# Patient Record
Sex: FEMALE | Race: WHITE | NOT HISPANIC OR LATINO | Employment: FULL TIME | URBAN - METROPOLITAN AREA
[De-identification: names, ages, dates, MRNs, and addresses within clinical notes are randomized per-mention and may not be internally consistent; named-entity substitution may affect disease eponyms.]

---

## 2017-04-28 ENCOUNTER — OFFICE VISIT - HEALTHEAST (OUTPATIENT)
Dept: FAMILY MEDICINE | Facility: CLINIC | Age: 32
End: 2017-04-28

## 2017-04-28 DIAGNOSIS — F41.1 ANXIETY STATE: ICD-10-CM

## 2017-04-28 DIAGNOSIS — Z00.00 HEALTHCARE MAINTENANCE: ICD-10-CM

## 2017-04-28 DIAGNOSIS — Z30.9 ENCOUNTER FOR CONTRACEPTIVE MANAGEMENT, UNSPECIFIED CONTRACEPTIVE ENCOUNTER TYPE: ICD-10-CM

## 2017-04-28 DIAGNOSIS — N76.0 ACUTE VAGINITIS: ICD-10-CM

## 2017-04-28 LAB
CHOLEST SERPL-MCNC: 200 MG/DL
FASTING STATUS PATIENT QL REPORTED: YES
HDLC SERPL-MCNC: 43 MG/DL
LDLC SERPL CALC-MCNC: 139 MG/DL
TRIGL SERPL-MCNC: 91 MG/DL

## 2017-04-28 ASSESSMENT — MIFFLIN-ST. JEOR: SCORE: 1535.11

## 2017-05-04 LAB
BKR LAB AP ABNORMAL BLEEDING: NO
BKR LAB AP BIRTH CONTROL/HORMONES: NORMAL
BKR LAB AP CERVICAL APPEARANCE: NORMAL
BKR LAB AP GYN ADEQUACY: NORMAL
BKR LAB AP GYN INTERPRETATION: NORMAL
BKR LAB AP GYN OTHER FINDINGS: NORMAL
BKR LAB AP HPV REFLEX: NORMAL
BKR LAB AP LMP: NORMAL
BKR LAB AP PATIENT STATUS: NORMAL
BKR LAB AP PREVIOUS ABNORMAL: NORMAL
BKR LAB AP PREVIOUS NORMAL: 2014
HIGH RISK?: NO
HPV INTERPRETATION - HISTORICAL: NORMAL
HPV INTERPRETER - HISTORICAL: NORMAL
PATH REPORT.COMMENTS IMP SPEC: NORMAL
RESULT FLAG (HE HISTORICAL CONVERSION): NORMAL

## 2017-09-14 ENCOUNTER — OFFICE VISIT - HEALTHEAST (OUTPATIENT)
Dept: FAMILY MEDICINE | Facility: CLINIC | Age: 32
End: 2017-09-14

## 2017-09-14 ENCOUNTER — HOSPITAL ENCOUNTER (OUTPATIENT)
Dept: ULTRASOUND IMAGING | Facility: HOSPITAL | Age: 32
Discharge: HOME | End: 2017-09-14
Attending: FAMILY MEDICINE

## 2017-09-14 DIAGNOSIS — R11.10 VOMITING: ICD-10-CM

## 2017-09-14 DIAGNOSIS — R50.9 FEVER: ICD-10-CM

## 2017-09-14 DIAGNOSIS — R11.0 NAUSEA: ICD-10-CM

## 2017-09-14 DIAGNOSIS — Z32.01 POSITIVE URINE PREGNANCY TEST: ICD-10-CM

## 2017-09-14 ASSESSMENT — MIFFLIN-ST. JEOR: SCORE: 1552.4

## 2017-09-15 ENCOUNTER — COMMUNICATION - HEALTHEAST (OUTPATIENT)
Dept: FAMILY MEDICINE | Facility: CLINIC | Age: 32
End: 2017-09-15

## 2017-10-13 ENCOUNTER — PRENATAL OFFICE VISIT - HEALTHEAST (OUTPATIENT)
Dept: FAMILY MEDICINE | Facility: CLINIC | Age: 32
End: 2017-10-13

## 2017-10-13 DIAGNOSIS — Z3A.11 11 WEEKS GESTATION OF PREGNANCY: ICD-10-CM

## 2017-10-13 LAB — HIV 1+2 AB+HIV1 P24 AG SERPL QL IA: NEGATIVE

## 2017-10-13 ASSESSMENT — MIFFLIN-ST. JEOR: SCORE: 1530.57

## 2017-10-14 LAB — HBV SURFACE AG SERPL QL IA: NEGATIVE

## 2017-10-16 LAB — SYPHILIS RPR SCREEN - HISTORICAL: NORMAL

## 2017-10-19 ENCOUNTER — COMMUNICATION - HEALTHEAST (OUTPATIENT)
Dept: FAMILY MEDICINE | Facility: CLINIC | Age: 32
End: 2017-10-19

## 2017-10-19 DIAGNOSIS — Z3A.11 11 WEEKS GESTATION OF PREGNANCY: ICD-10-CM

## 2017-10-24 ENCOUNTER — RECORDS - HEALTHEAST (OUTPATIENT)
Dept: ADMINISTRATIVE | Facility: OTHER | Age: 32
End: 2017-10-24

## 2017-10-26 ENCOUNTER — COMMUNICATION - HEALTHEAST (OUTPATIENT)
Dept: FAMILY MEDICINE | Facility: CLINIC | Age: 32
End: 2017-10-26

## 2017-11-13 ENCOUNTER — PRENATAL OFFICE VISIT - HEALTHEAST (OUTPATIENT)
Dept: FAMILY MEDICINE | Facility: CLINIC | Age: 32
End: 2017-11-13

## 2017-11-13 DIAGNOSIS — Z3A.15 15 WEEKS GESTATION OF PREGNANCY: ICD-10-CM

## 2017-12-18 ENCOUNTER — HOSPITAL ENCOUNTER (OUTPATIENT)
Dept: ULTRASOUND IMAGING | Facility: HOSPITAL | Age: 32
Discharge: HOME | End: 2017-12-18
Attending: FAMILY MEDICINE

## 2017-12-18 DIAGNOSIS — Z3A.15 15 WEEKS GESTATION OF PREGNANCY: ICD-10-CM

## 2017-12-20 ENCOUNTER — PRENATAL OFFICE VISIT - HEALTHEAST (OUTPATIENT)
Dept: FAMILY MEDICINE | Facility: CLINIC | Age: 32
End: 2017-12-20

## 2017-12-20 DIAGNOSIS — Z3A.20 20 WEEKS GESTATION OF PREGNANCY: ICD-10-CM

## 2018-01-17 ENCOUNTER — COMMUNICATION - HEALTHEAST (OUTPATIENT)
Dept: FAMILY MEDICINE | Facility: CLINIC | Age: 33
End: 2018-01-17

## 2018-01-17 ENCOUNTER — PRENATAL OFFICE VISIT - HEALTHEAST (OUTPATIENT)
Dept: FAMILY MEDICINE | Facility: CLINIC | Age: 33
End: 2018-01-17

## 2018-01-17 DIAGNOSIS — Z3A.24 24 WEEKS GESTATION OF PREGNANCY: ICD-10-CM

## 2018-01-17 DIAGNOSIS — F41.1 ANXIETY STATE: ICD-10-CM

## 2018-01-18 ENCOUNTER — COMMUNICATION - HEALTHEAST (OUTPATIENT)
Dept: FAMILY MEDICINE | Facility: CLINIC | Age: 33
End: 2018-01-18

## 2018-01-18 LAB
CLUE CELLS: ABNORMAL
TRICHOMONAS, WET PREP: ABNORMAL
YEAST, WET PREP: ABNORMAL

## 2018-01-24 ENCOUNTER — OFFICE VISIT - HEALTHEAST (OUTPATIENT)
Dept: FAMILY MEDICINE | Facility: CLINIC | Age: 33
End: 2018-01-24

## 2018-01-24 DIAGNOSIS — N76.0 VAGINITIS: ICD-10-CM

## 2018-01-24 DIAGNOSIS — Z3A.25 25 WEEKS GESTATION OF PREGNANCY: ICD-10-CM

## 2018-01-24 LAB
CLUE CELLS: NORMAL
FASTING STATUS PATIENT QL REPORTED: YES
GLUCOSE 1H P 50 G GLC PO SERPL-MCNC: 153 MG/DL (ref 70–139)
HGB BLD-MCNC: 10.6 G/DL (ref 12–16)
TRICHOMONAS, WET PREP: NORMAL
YEAST, WET PREP: NORMAL

## 2018-01-24 ASSESSMENT — MIFFLIN-ST. JEOR: SCORE: 1571.11

## 2018-01-25 ENCOUNTER — COMMUNICATION - HEALTHEAST (OUTPATIENT)
Dept: FAMILY MEDICINE | Facility: CLINIC | Age: 33
End: 2018-01-25

## 2018-01-25 DIAGNOSIS — Z3A.26 26 WEEKS GESTATION OF PREGNANCY: ICD-10-CM

## 2018-01-30 ENCOUNTER — AMBULATORY - HEALTHEAST (OUTPATIENT)
Dept: LAB | Facility: CLINIC | Age: 33
End: 2018-01-30

## 2018-01-30 DIAGNOSIS — Z3A.26 26 WEEKS GESTATION OF PREGNANCY: ICD-10-CM

## 2018-01-30 LAB
FASTING STATUS PATIENT QL REPORTED: NORMAL
GLUCOSE 1H P 100 G GLC PO SERPL-MCNC: 150 MG/DL
GLUCOSE 2H P 100 G GLC PO SERPL-MCNC: 103 MG/DL
GLUCOSE 3H P 100 G GLC PO SERPL-MCNC: 103 MG/DL
GLUCOSE P FAST SERPL-MCNC: 86 MG/DL

## 2018-02-26 ENCOUNTER — PRENATAL OFFICE VISIT - HEALTHEAST (OUTPATIENT)
Dept: FAMILY MEDICINE | Facility: CLINIC | Age: 33
End: 2018-02-26

## 2018-02-26 DIAGNOSIS — Z3A.30 30 WEEKS GESTATION OF PREGNANCY: ICD-10-CM

## 2018-02-26 LAB — HGB BLD-MCNC: 11 G/DL (ref 12–16)

## 2018-03-04 ENCOUNTER — COMMUNICATION - HEALTHEAST (OUTPATIENT)
Dept: FAMILY MEDICINE | Facility: CLINIC | Age: 33
End: 2018-03-04

## 2018-03-15 ENCOUNTER — COMMUNICATION - HEALTHEAST (OUTPATIENT)
Dept: FAMILY MEDICINE | Facility: CLINIC | Age: 33
End: 2018-03-15

## 2018-03-26 ENCOUNTER — PRENATAL OFFICE VISIT - HEALTHEAST (OUTPATIENT)
Dept: FAMILY MEDICINE | Facility: CLINIC | Age: 33
End: 2018-03-26

## 2018-03-26 DIAGNOSIS — Z3A.34 34 WEEKS GESTATION OF PREGNANCY: ICD-10-CM

## 2018-04-09 ENCOUNTER — PRENATAL OFFICE VISIT - HEALTHEAST (OUTPATIENT)
Dept: FAMILY MEDICINE | Facility: CLINIC | Age: 33
End: 2018-04-09

## 2018-04-09 DIAGNOSIS — Z3A.36 36 WEEKS GESTATION OF PREGNANCY: ICD-10-CM

## 2018-04-10 ENCOUNTER — ANESTHESIA - HEALTHEAST (OUTPATIENT)
Dept: OBGYN | Facility: HOSPITAL | Age: 33
End: 2018-04-10

## 2018-04-10 LAB
ALLERGIC TO PENICILLIN: NO
GP B STREP DNA SPEC QL NAA+PROBE: NEGATIVE

## 2018-04-11 ENCOUNTER — RECORDS - HEALTHEAST (OUTPATIENT)
Dept: ADMINISTRATIVE | Facility: OTHER | Age: 33
End: 2018-04-11

## 2018-04-16 ENCOUNTER — COMMUNICATION - HEALTHEAST (OUTPATIENT)
Dept: FAMILY MEDICINE | Facility: CLINIC | Age: 33
End: 2018-04-16

## 2018-04-22 ENCOUNTER — COMMUNICATION - HEALTHEAST (OUTPATIENT)
Dept: FAMILY MEDICINE | Facility: CLINIC | Age: 33
End: 2018-04-22

## 2018-04-22 DIAGNOSIS — F41.1 ANXIETY STATE: ICD-10-CM

## 2018-04-25 ENCOUNTER — COMMUNICATION - HEALTHEAST (OUTPATIENT)
Dept: FAMILY MEDICINE | Facility: CLINIC | Age: 33
End: 2018-04-25

## 2018-05-07 ENCOUNTER — COMMUNICATION - HEALTHEAST (OUTPATIENT)
Dept: TELEHEALTH | Facility: CLINIC | Age: 33
End: 2018-05-07

## 2018-05-07 ENCOUNTER — COMMUNICATION - HEALTHEAST (OUTPATIENT)
Dept: HEALTH INFORMATION MANAGEMENT | Facility: CLINIC | Age: 33
End: 2018-05-07

## 2018-05-25 ENCOUNTER — OFFICE VISIT - HEALTHEAST (OUTPATIENT)
Dept: FAMILY MEDICINE | Facility: CLINIC | Age: 33
End: 2018-05-25

## 2018-05-25 ASSESSMENT — MIFFLIN-ST. JEOR: SCORE: 1518.1

## 2018-06-26 ENCOUNTER — COMMUNICATION - HEALTHEAST (OUTPATIENT)
Dept: FAMILY MEDICINE | Facility: CLINIC | Age: 33
End: 2018-06-26

## 2018-08-06 ENCOUNTER — OFFICE VISIT - HEALTHEAST (OUTPATIENT)
Dept: FAMILY MEDICINE | Facility: CLINIC | Age: 33
End: 2018-08-06

## 2018-08-06 DIAGNOSIS — J02.9 SORE THROAT: ICD-10-CM

## 2018-08-06 LAB — DEPRECATED S PYO AG THROAT QL EIA: NORMAL

## 2018-08-06 ASSESSMENT — MIFFLIN-ST. JEOR: SCORE: 1499.67

## 2018-08-07 LAB — GROUP A STREP BY PCR: NORMAL

## 2018-10-12 ENCOUNTER — AMBULATORY - HEALTHEAST (OUTPATIENT)
Dept: NURSING | Facility: CLINIC | Age: 33
End: 2018-10-12

## 2018-11-23 ENCOUNTER — OFFICE VISIT - HEALTHEAST (OUTPATIENT)
Dept: FAMILY MEDICINE | Facility: CLINIC | Age: 33
End: 2018-11-23

## 2018-11-23 DIAGNOSIS — R10.84 ABDOMINAL PAIN, GENERALIZED: ICD-10-CM

## 2018-11-23 DIAGNOSIS — F41.1 ANXIETY STATE: ICD-10-CM

## 2018-11-23 LAB
ALBUMIN SERPL-MCNC: 3.9 G/DL (ref 3.5–5)
ALP SERPL-CCNC: 94 U/L (ref 45–120)
ALT SERPL W P-5'-P-CCNC: 10 U/L (ref 0–45)
ANION GAP SERPL CALCULATED.3IONS-SCNC: 9 MMOL/L (ref 5–18)
AST SERPL W P-5'-P-CCNC: 12 U/L (ref 0–40)
BILIRUB SERPL-MCNC: 0.4 MG/DL (ref 0–1)
BUN SERPL-MCNC: 8 MG/DL (ref 8–22)
CALCIUM SERPL-MCNC: 9.3 MG/DL (ref 8.5–10.5)
CHLORIDE BLD-SCNC: 105 MMOL/L (ref 98–107)
CO2 SERPL-SCNC: 24 MMOL/L (ref 22–31)
CREAT SERPL-MCNC: 0.62 MG/DL (ref 0.6–1.1)
ERYTHROCYTE [DISTWIDTH] IN BLOOD BY AUTOMATED COUNT: 11.7 % (ref 11–14.5)
GFR SERPL CREATININE-BSD FRML MDRD: >60 ML/MIN/1.73M2
GLUCOSE BLD-MCNC: 84 MG/DL (ref 70–125)
HCT VFR BLD AUTO: 38.6 % (ref 35–47)
HGB BLD-MCNC: 13 G/DL (ref 12–16)
MCH RBC QN AUTO: 28.4 PG (ref 27–34)
MCHC RBC AUTO-ENTMCNC: 33.7 G/DL (ref 32–36)
MCV RBC AUTO: 84 FL (ref 80–100)
PLATELET # BLD AUTO: 369 THOU/UL (ref 140–440)
PMV BLD AUTO: 7.2 FL (ref 7–10)
POTASSIUM BLD-SCNC: 3.8 MMOL/L (ref 3.5–5)
PROT SERPL-MCNC: 7.5 G/DL (ref 6–8)
RBC # BLD AUTO: 4.59 MILL/UL (ref 3.8–5.4)
SODIUM SERPL-SCNC: 138 MMOL/L (ref 136–145)
WBC: 7.8 THOU/UL (ref 4–11)

## 2018-11-23 ASSESSMENT — MIFFLIN-ST. JEOR: SCORE: 1516.68

## 2019-01-29 ENCOUNTER — COMMUNICATION - HEALTHEAST (OUTPATIENT)
Dept: FAMILY MEDICINE | Facility: CLINIC | Age: 34
End: 2019-01-29

## 2019-02-11 ENCOUNTER — COMMUNICATION - HEALTHEAST (OUTPATIENT)
Dept: FAMILY MEDICINE | Facility: CLINIC | Age: 34
End: 2019-02-11

## 2019-04-24 ENCOUNTER — COMMUNICATION - HEALTHEAST (OUTPATIENT)
Dept: FAMILY MEDICINE | Facility: CLINIC | Age: 34
End: 2019-04-24

## 2019-06-19 ENCOUNTER — COMMUNICATION - HEALTHEAST (OUTPATIENT)
Dept: FAMILY MEDICINE | Facility: CLINIC | Age: 34
End: 2019-06-19

## 2019-06-25 ENCOUNTER — OFFICE VISIT - HEALTHEAST (OUTPATIENT)
Dept: FAMILY MEDICINE | Facility: CLINIC | Age: 34
End: 2019-06-25

## 2019-06-25 DIAGNOSIS — Z32.00 POSSIBLE PREGNANCY: ICD-10-CM

## 2019-06-25 LAB — HCG UR QL: POSITIVE

## 2019-06-25 ASSESSMENT — MIFFLIN-ST. JEOR: SCORE: 1588.12

## 2019-07-12 ENCOUNTER — HOSPITAL ENCOUNTER (OUTPATIENT)
Dept: ULTRASOUND IMAGING | Facility: HOSPITAL | Age: 34
Discharge: HOME | End: 2019-07-12
Attending: FAMILY MEDICINE

## 2019-07-12 DIAGNOSIS — Z32.00 POSSIBLE PREGNANCY: ICD-10-CM

## 2019-07-23 ENCOUNTER — COMMUNICATION - HEALTHEAST (OUTPATIENT)
Dept: FAMILY MEDICINE | Facility: CLINIC | Age: 34
End: 2019-07-23

## 2019-07-26 ENCOUNTER — COMMUNICATION - HEALTHEAST (OUTPATIENT)
Dept: FAMILY MEDICINE | Facility: CLINIC | Age: 34
End: 2019-07-26

## 2019-08-11 ENCOUNTER — COMMUNICATION - HEALTHEAST (OUTPATIENT)
Dept: FAMILY MEDICINE | Facility: CLINIC | Age: 34
End: 2019-08-11

## 2019-08-12 ENCOUNTER — COMMUNICATION - HEALTHEAST (OUTPATIENT)
Dept: FAMILY MEDICINE | Facility: CLINIC | Age: 34
End: 2019-08-12

## 2019-08-12 ENCOUNTER — TRANSFERRED RECORDS (OUTPATIENT)
Dept: HEALTH INFORMATION MANAGEMENT | Facility: CLINIC | Age: 34
End: 2019-08-12

## 2019-08-12 ENCOUNTER — PRENATAL OFFICE VISIT - HEALTHEAST (OUTPATIENT)
Dept: FAMILY MEDICINE | Facility: CLINIC | Age: 34
End: 2019-08-12

## 2019-08-12 DIAGNOSIS — Z34.82 ENCOUNTER FOR SUPERVISION OF OTHER NORMAL PREGNANCY IN SECOND TRIMESTER: ICD-10-CM

## 2019-08-12 DIAGNOSIS — F41.1 ANXIETY STATE: ICD-10-CM

## 2019-08-12 DIAGNOSIS — Z00.00 ROUTINE GENERAL MEDICAL EXAMINATION AT A HEALTH CARE FACILITY: ICD-10-CM

## 2019-08-12 LAB
ALBUMIN UR-MCNC: NEGATIVE MG/DL
APPEARANCE UR: CLEAR
BASOPHILS # BLD AUTO: 0 THOU/UL (ref 0–0.2)
BASOPHILS NFR BLD AUTO: 0 % (ref 0–2)
BILIRUB UR QL STRIP: NEGATIVE
COLOR UR AUTO: YELLOW
EOSINOPHIL # BLD AUTO: 0.2 THOU/UL (ref 0–0.4)
EOSINOPHIL NFR BLD AUTO: 3 % (ref 0–6)
ERYTHROCYTE [DISTWIDTH] IN BLOOD BY AUTOMATED COUNT: 13.7 % (ref 11–14.5)
GLUCOSE UR STRIP-MCNC: NEGATIVE MG/DL
HCT VFR BLD AUTO: 35 % (ref 35–47)
HGB BLD-MCNC: 11.6 G/DL (ref 12–16)
HGB UR QL STRIP: NEGATIVE
HIV 1+2 AB+HIV1 P24 AG SERPL QL IA: NEGATIVE
HPV ABSTRACT: NORMAL
KETONES UR STRIP-MCNC: NEGATIVE MG/DL
LEUKOCYTE ESTERASE UR QL STRIP: ABNORMAL
LYMPHOCYTES # BLD AUTO: 3.1 THOU/UL (ref 0.8–4.4)
LYMPHOCYTES NFR BLD AUTO: 33 % (ref 20–40)
MCH RBC QN AUTO: 28 PG (ref 27–34)
MCHC RBC AUTO-ENTMCNC: 33.1 G/DL (ref 32–36)
MCV RBC AUTO: 85 FL (ref 80–100)
MONOCYTES # BLD AUTO: 0.4 THOU/UL (ref 0–0.9)
MONOCYTES NFR BLD AUTO: 5 % (ref 2–10)
NEUTROPHILS # BLD AUTO: 5.4 THOU/UL (ref 2–7.7)
NEUTROPHILS NFR BLD AUTO: 59 % (ref 50–70)
NITRATE UR QL: NEGATIVE
PAP-ABSTRACT: ABNORMAL
PH UR STRIP: 6.5 [PH] (ref 5–8)
PLATELET # BLD AUTO: 288 THOU/UL (ref 140–440)
PMV BLD AUTO: 10.3 FL (ref 8.5–12.5)
RBC # BLD AUTO: 4.14 MILL/UL (ref 3.8–5.4)
SP GR UR STRIP: 1.02 (ref 1–1.03)
UROBILINOGEN UR STRIP-ACNC: ABNORMAL
WBC: 9.2 THOU/UL (ref 4–11)

## 2019-08-12 ASSESSMENT — MIFFLIN-ST. JEOR: SCORE: 1581.32

## 2019-08-13 LAB
ABO/RH(D): NORMAL
ABORH REPEAT: NORMAL
ANTIBODY SCREEN: NEGATIVE
BACTERIA SPEC CULT: NO GROWTH
HBV SURFACE AG SERPL QL IA: NEGATIVE
HPV SOURCE: NORMAL
HUMAN PAPILLOMA VIRUS 16 DNA: NEGATIVE
HUMAN PAPILLOMA VIRUS 18 DNA: NEGATIVE
HUMAN PAPILLOMA VIRUS FINAL DIAGNOSIS: NORMAL
HUMAN PAPILLOMA VIRUS OTHER HR: NEGATIVE
RUBV IGG SERPL QL IA: POSITIVE
SPECIMEN DESCRIPTION: NORMAL
T PALLIDUM AB SER QL: NEGATIVE

## 2019-08-15 ENCOUNTER — RECORDS - HEALTHEAST (OUTPATIENT)
Dept: ADMINISTRATIVE | Facility: OTHER | Age: 34
End: 2019-08-15

## 2019-08-20 LAB
BKR LAB AP ABNORMAL BLEEDING: NO
BKR LAB AP BIRTH CONTROL/HORMONES: ABNORMAL
BKR LAB AP CERVICAL APPEARANCE: NORMAL
BKR LAB AP GYN ADEQUACY: ABNORMAL
BKR LAB AP GYN INTERPRETATION: ABNORMAL
BKR LAB AP HPV REFLEX: ABNORMAL
BKR LAB AP LMP: ABNORMAL
BKR LAB AP PATIENT STATUS: ABNORMAL
BKR LAB AP PREVIOUS ABNORMAL: ABNORMAL
BKR LAB AP PREVIOUS NORMAL: 2017
HIGH RISK?: NO
PATH REPORT.COMMENTS IMP SPEC: ABNORMAL
RESULT FLAG (HE HISTORICAL CONVERSION): ABNORMAL

## 2019-08-23 ENCOUNTER — COMMUNICATION - HEALTHEAST (OUTPATIENT)
Dept: FAMILY MEDICINE | Facility: CLINIC | Age: 34
End: 2019-08-23

## 2019-09-09 ENCOUNTER — COMMUNICATION - HEALTHEAST (OUTPATIENT)
Dept: FAMILY MEDICINE | Facility: CLINIC | Age: 34
End: 2019-09-09

## 2019-09-11 ENCOUNTER — PRENATAL OFFICE VISIT - HEALTHEAST (OUTPATIENT)
Dept: FAMILY MEDICINE | Facility: CLINIC | Age: 34
End: 2019-09-11

## 2019-09-11 DIAGNOSIS — Z34.82 ENCOUNTER FOR SUPERVISION OF OTHER NORMAL PREGNANCY IN SECOND TRIMESTER: ICD-10-CM

## 2019-09-23 ENCOUNTER — COMMUNICATION - HEALTHEAST (OUTPATIENT)
Dept: FAMILY MEDICINE | Facility: CLINIC | Age: 34
End: 2019-09-23

## 2019-10-01 ENCOUNTER — COMMUNICATION - HEALTHEAST (OUTPATIENT)
Dept: FAMILY MEDICINE | Facility: CLINIC | Age: 34
End: 2019-10-01

## 2019-10-02 ENCOUNTER — RECORDS - HEALTHEAST (OUTPATIENT)
Dept: ADMINISTRATIVE | Facility: OTHER | Age: 34
End: 2019-10-02

## 2019-10-04 ENCOUNTER — COMMUNICATION - HEALTHEAST (OUTPATIENT)
Dept: FAMILY MEDICINE | Facility: CLINIC | Age: 34
End: 2019-10-04

## 2019-10-09 ENCOUNTER — PRENATAL OFFICE VISIT - HEALTHEAST (OUTPATIENT)
Dept: FAMILY MEDICINE | Facility: CLINIC | Age: 34
End: 2019-10-09

## 2019-10-09 DIAGNOSIS — Z34.82 ENCOUNTER FOR SUPERVISION OF OTHER NORMAL PREGNANCY IN SECOND TRIMESTER: ICD-10-CM

## 2019-11-13 ENCOUNTER — PRENATAL OFFICE VISIT - HEALTHEAST (OUTPATIENT)
Dept: FAMILY MEDICINE | Facility: CLINIC | Age: 34
End: 2019-11-13

## 2019-11-13 DIAGNOSIS — Z34.82 ENCOUNTER FOR SUPERVISION OF OTHER NORMAL PREGNANCY IN SECOND TRIMESTER: ICD-10-CM

## 2019-11-13 LAB
FASTING STATUS PATIENT QL REPORTED: NO
GLUCOSE 1H P 50 G GLC PO SERPL-MCNC: 114 MG/DL (ref 70–139)
HGB BLD-MCNC: 11.1 G/DL (ref 12–16)

## 2019-11-14 LAB — T PALLIDUM AB SER QL: NEGATIVE

## 2019-12-11 ENCOUNTER — PRENATAL OFFICE VISIT - HEALTHEAST (OUTPATIENT)
Dept: FAMILY MEDICINE | Facility: CLINIC | Age: 34
End: 2019-12-11

## 2019-12-11 DIAGNOSIS — Z34.83 ENCOUNTER FOR SUPERVISION OF OTHER NORMAL PREGNANCY IN THIRD TRIMESTER: ICD-10-CM

## 2019-12-31 ENCOUNTER — PRENATAL OFFICE VISIT - HEALTHEAST (OUTPATIENT)
Dept: FAMILY MEDICINE | Facility: CLINIC | Age: 34
End: 2019-12-31

## 2019-12-31 DIAGNOSIS — Z34.83 ENCOUNTER FOR SUPERVISION OF OTHER NORMAL PREGNANCY IN THIRD TRIMESTER: ICD-10-CM

## 2020-01-13 ENCOUNTER — PRENATAL OFFICE VISIT - HEALTHEAST (OUTPATIENT)
Dept: FAMILY MEDICINE | Facility: CLINIC | Age: 35
End: 2020-01-13

## 2020-01-13 DIAGNOSIS — Z34.83 ENCOUNTER FOR SUPERVISION OF OTHER NORMAL PREGNANCY IN THIRD TRIMESTER: ICD-10-CM

## 2020-01-14 LAB
ALLERGIC TO PENICILLIN: NO
GP B STREP DNA SPEC QL NAA+PROBE: POSITIVE

## 2020-01-24 ENCOUNTER — PRENATAL OFFICE VISIT - HEALTHEAST (OUTPATIENT)
Dept: FAMILY MEDICINE | Facility: CLINIC | Age: 35
End: 2020-01-24

## 2020-01-24 DIAGNOSIS — Z34.83 ENCOUNTER FOR SUPERVISION OF OTHER NORMAL PREGNANCY IN THIRD TRIMESTER: ICD-10-CM

## 2020-01-31 ENCOUNTER — PRENATAL OFFICE VISIT - HEALTHEAST (OUTPATIENT)
Dept: FAMILY MEDICINE | Facility: CLINIC | Age: 35
End: 2020-01-31

## 2020-01-31 DIAGNOSIS — Z34.83 ENCOUNTER FOR SUPERVISION OF OTHER NORMAL PREGNANCY IN THIRD TRIMESTER: ICD-10-CM

## 2020-01-31 DIAGNOSIS — Z3A.37 37 WEEKS GESTATION OF PREGNANCY: ICD-10-CM

## 2020-01-31 LAB
ALBUMIN UR-MCNC: NEGATIVE MG/DL
GLUCOSE UR STRIP-MCNC: NEGATIVE MG/DL
KETONES UR STRIP-MCNC: NEGATIVE MG/DL

## 2020-02-06 ENCOUNTER — COMMUNICATION - HEALTHEAST (OUTPATIENT)
Dept: FAMILY MEDICINE | Facility: CLINIC | Age: 35
End: 2020-02-06

## 2020-02-06 DIAGNOSIS — F41.1 ANXIETY STATE: ICD-10-CM

## 2020-02-07 ENCOUNTER — PRENATAL OFFICE VISIT - HEALTHEAST (OUTPATIENT)
Dept: FAMILY MEDICINE | Facility: CLINIC | Age: 35
End: 2020-02-07

## 2020-02-07 DIAGNOSIS — Z3A.38 38 WEEKS GESTATION OF PREGNANCY: ICD-10-CM

## 2020-02-07 DIAGNOSIS — Z34.83 ENCOUNTER FOR SUPERVISION OF OTHER NORMAL PREGNANCY IN THIRD TRIMESTER: ICD-10-CM

## 2020-02-12 ENCOUNTER — PRENATAL OFFICE VISIT - HEALTHEAST (OUTPATIENT)
Dept: FAMILY MEDICINE | Facility: CLINIC | Age: 35
End: 2020-02-12

## 2020-02-12 ENCOUNTER — COMMUNICATION - HEALTHEAST (OUTPATIENT)
Dept: FAMILY MEDICINE | Facility: CLINIC | Age: 35
End: 2020-02-12

## 2020-02-12 DIAGNOSIS — Z3A.39 39 WEEKS GESTATION OF PREGNANCY: ICD-10-CM

## 2020-02-14 ENCOUNTER — AMBULATORY - HEALTHEAST (OUTPATIENT)
Dept: NURSING | Facility: CLINIC | Age: 35
End: 2020-02-14

## 2020-02-15 ENCOUNTER — COMMUNICATION - HEALTHEAST (OUTPATIENT)
Dept: FAMILY MEDICINE | Facility: CLINIC | Age: 35
End: 2020-02-15

## 2020-02-16 ENCOUNTER — ANESTHESIA - HEALTHEAST (OUTPATIENT)
Dept: OBGYN | Facility: HOSPITAL | Age: 35
End: 2020-02-16

## 2020-02-16 ENCOUNTER — SURGERY - HEALTHEAST (OUTPATIENT)
Dept: OBGYN | Facility: HOSPITAL | Age: 35
End: 2020-02-16

## 2020-02-16 ASSESSMENT — MIFFLIN-ST. JEOR: SCORE: 1742.35

## 2020-02-18 ENCOUNTER — COMMUNICATION - HEALTHEAST (OUTPATIENT)
Dept: FAMILY MEDICINE | Facility: CLINIC | Age: 35
End: 2020-02-18

## 2020-03-02 ENCOUNTER — COMMUNICATION - HEALTHEAST (OUTPATIENT)
Dept: FAMILY MEDICINE | Facility: CLINIC | Age: 35
End: 2020-03-02

## 2020-03-18 ENCOUNTER — OFFICE VISIT - HEALTHEAST (OUTPATIENT)
Dept: FAMILY MEDICINE | Facility: CLINIC | Age: 35
End: 2020-03-18

## 2020-03-18 ASSESSMENT — MIFFLIN-ST. JEOR: SCORE: 1561.19

## 2020-03-25 ENCOUNTER — COMMUNICATION - HEALTHEAST (OUTPATIENT)
Dept: FAMILY MEDICINE | Facility: CLINIC | Age: 35
End: 2020-03-25

## 2020-05-01 ENCOUNTER — COMMUNICATION - HEALTHEAST (OUTPATIENT)
Dept: FAMILY MEDICINE | Facility: CLINIC | Age: 35
End: 2020-05-01

## 2020-08-07 ENCOUNTER — COMMUNICATION - HEALTHEAST (OUTPATIENT)
Dept: FAMILY MEDICINE | Facility: CLINIC | Age: 35
End: 2020-08-07

## 2020-08-07 DIAGNOSIS — F41.1 ANXIETY STATE: ICD-10-CM

## 2020-09-22 ENCOUNTER — COMMUNICATION - HEALTHEAST (OUTPATIENT)
Dept: FAMILY MEDICINE | Facility: CLINIC | Age: 35
End: 2020-09-22

## 2020-11-02 ENCOUNTER — COMMUNICATION - HEALTHEAST (OUTPATIENT)
Dept: FAMILY MEDICINE | Facility: CLINIC | Age: 35
End: 2020-11-02

## 2021-02-01 ENCOUNTER — COMMUNICATION - HEALTHEAST (OUTPATIENT)
Dept: FAMILY MEDICINE | Facility: CLINIC | Age: 36
End: 2021-02-01

## 2021-02-01 DIAGNOSIS — F41.1 ANXIETY STATE: ICD-10-CM

## 2021-04-27 ASSESSMENT — ENCOUNTER SYMPTOMS
DYSURIA: 0
SHORTNESS OF BREATH: 0
NAUSEA: 0
SORE THROAT: 0
FREQUENCY: 0
DIARRHEA: 0
BREAST MASS: 0
HEADACHES: 0
CONSTIPATION: 0
COUGH: 0
MYALGIAS: 0
HEMATURIA: 0
ARTHRALGIAS: 0
JOINT SWELLING: 0
HEARTBURN: 0
PALPITATIONS: 0
NERVOUS/ANXIOUS: 0
WEAKNESS: 0
HEMATOCHEZIA: 0
ABDOMINAL PAIN: 0
CHILLS: 0
DIZZINESS: 1
EYE PAIN: 0
PARESTHESIAS: 0
FEVER: 0

## 2021-04-28 ENCOUNTER — TELEPHONE (OUTPATIENT)
Dept: FAMILY MEDICINE | Facility: CLINIC | Age: 36
End: 2021-04-28

## 2021-04-28 ENCOUNTER — OFFICE VISIT (OUTPATIENT)
Dept: FAMILY MEDICINE | Facility: CLINIC | Age: 36
End: 2021-04-28
Payer: OTHER GOVERNMENT

## 2021-04-28 VITALS
HEIGHT: 64 IN | RESPIRATION RATE: 12 BRPM | HEART RATE: 68 BPM | BODY MASS INDEX: 36.6 KG/M2 | WEIGHT: 214.38 LBS | SYSTOLIC BLOOD PRESSURE: 130 MMHG | TEMPERATURE: 98.5 F | DIASTOLIC BLOOD PRESSURE: 84 MMHG

## 2021-04-28 DIAGNOSIS — Z30.011 ENCOUNTER FOR INITIAL PRESCRIPTION OF CONTRACEPTIVE PILLS: ICD-10-CM

## 2021-04-28 DIAGNOSIS — F41.9 ANXIETY: Primary | ICD-10-CM

## 2021-04-28 DIAGNOSIS — F41.9 ANXIETY: ICD-10-CM

## 2021-04-28 DIAGNOSIS — Z00.00 HEALTHCARE MAINTENANCE: Primary | ICD-10-CM

## 2021-04-28 LAB
ANION GAP SERPL CALCULATED.3IONS-SCNC: 6 MMOL/L (ref 3–14)
B-HCG SERPL-ACNC: <1 IU/L (ref 0–5)
BUN SERPL-MCNC: 7 MG/DL (ref 7–30)
CALCIUM SERPL-MCNC: 8.6 MG/DL (ref 8.5–10.1)
CHLORIDE SERPL-SCNC: 106 MMOL/L (ref 94–109)
CO2 SERPL-SCNC: 24 MMOL/L (ref 20–32)
CREAT SERPL-MCNC: 0.56 MG/DL (ref 0.52–1.04)
ERYTHROCYTE [DISTWIDTH] IN BLOOD BY AUTOMATED COUNT: 13.6 % (ref 10–15)
GFR SERPL CREATININE-BSD FRML MDRD: >90 ML/MIN/{1.73_M2}
GLUCOSE SERPL-MCNC: 91 MG/DL (ref 70–99)
HBA1C MFR BLD: 5.4 % (ref 0–5.6)
HCT VFR BLD AUTO: 38.3 % (ref 35–47)
HGB BLD-MCNC: 12.8 G/DL (ref 11.7–15.7)
IRON SATN MFR SERPL: 14 % (ref 15–46)
IRON SERPL-MCNC: 55 UG/DL (ref 35–180)
MCH RBC QN AUTO: 28.3 PG (ref 26.5–33)
MCHC RBC AUTO-ENTMCNC: 33.4 G/DL (ref 31.5–36.5)
MCV RBC AUTO: 85 FL (ref 78–100)
PLATELET # BLD AUTO: 333 10E9/L (ref 150–450)
POTASSIUM SERPL-SCNC: 3.6 MMOL/L (ref 3.4–5.3)
RBC # BLD AUTO: 4.53 10E12/L (ref 3.8–5.2)
SODIUM SERPL-SCNC: 136 MMOL/L (ref 133–144)
TIBC SERPL-MCNC: 384 UG/DL (ref 240–430)
WBC # BLD AUTO: 10.6 10E9/L (ref 4–11)

## 2021-04-28 PROCEDURE — 84702 CHORIONIC GONADOTROPIN TEST: CPT | Performed by: FAMILY MEDICINE

## 2021-04-28 PROCEDURE — 83036 HEMOGLOBIN GLYCOSYLATED A1C: CPT | Performed by: FAMILY MEDICINE

## 2021-04-28 PROCEDURE — 83550 IRON BINDING TEST: CPT | Performed by: FAMILY MEDICINE

## 2021-04-28 PROCEDURE — 85027 COMPLETE CBC AUTOMATED: CPT | Performed by: FAMILY MEDICINE

## 2021-04-28 PROCEDURE — 80048 BASIC METABOLIC PNL TOTAL CA: CPT | Performed by: FAMILY MEDICINE

## 2021-04-28 PROCEDURE — 36415 COLL VENOUS BLD VENIPUNCTURE: CPT | Performed by: FAMILY MEDICINE

## 2021-04-28 PROCEDURE — 99395 PREV VISIT EST AGE 18-39: CPT | Performed by: FAMILY MEDICINE

## 2021-04-28 PROCEDURE — 83540 ASSAY OF IRON: CPT | Performed by: FAMILY MEDICINE

## 2021-04-28 RX ORDER — CETIRIZINE HYDROCHLORIDE 10 MG/1
TABLET ORAL
COMMUNITY
End: 2021-08-03

## 2021-04-28 RX ORDER — VENLAFAXINE HYDROCHLORIDE 75 MG/1
75 CAPSULE, EXTENDED RELEASE ORAL
COMMUNITY
Start: 2021-02-01 | End: 2021-08-03

## 2021-04-28 RX ORDER — LEVONORGESTREL/ETHIN.ESTRADIOL 0.1-0.02MG
1 TABLET ORAL DAILY
Qty: 84 TABLET | Refills: 4 | Status: SHIPPED | OUTPATIENT
Start: 2021-04-28 | End: 2022-04-29

## 2021-04-28 RX ORDER — VENLAFAXINE HYDROCHLORIDE 75 MG/1
75 CAPSULE, EXTENDED RELEASE ORAL
Status: CANCELLED | OUTPATIENT
Start: 2021-04-28

## 2021-04-28 RX ORDER — VENLAFAXINE HYDROCHLORIDE 150 MG/1
150 TABLET, EXTENDED RELEASE ORAL DAILY
Qty: 90 TABLET | Refills: 0 | Status: SHIPPED | OUTPATIENT
Start: 2021-04-28 | End: 2022-04-27

## 2021-04-28 ASSESSMENT — MIFFLIN-ST. JEOR: SCORE: 1662.4

## 2021-04-28 NOTE — TELEPHONE ENCOUNTER
Prior Authorization Retail Medication Request    Medication/Dose:   ICD code (if different than what is on RX):  Anxiety [F41.9]   Previously Tried and Failed:    Rationale:      Covermymeds:  Key- [id number]  Last Name- Sabas DEWEY- 1985    Pharmacy Information (if different than what is on RX)  Name:  [facility] [address]  Phone:  [phone]

## 2021-04-29 NOTE — PROGRESS NOTES
"    Assessment/Plan:     Health maintenance female exam.  All questions answered.  PAP UTD - due next year (ASCUS in 2019 with negative HPV)  Breast self exam technique reviewed and patient encouraged to perform self-exam monthly.  Discussed healthy lifestyle modifications.  Await non-fasting lab results    BMI:   Estimated body mass index is 36.8 kg/m  as calculated from the following:    Height as of this encounter: 1.626 m (5' 4\").    Weight as of this encounter: 97.2 kg (214 lb 6 oz).   Weight management plan: Discussed healthy diet and exercise guidelines      Healthcare maintenance  - HCG quantitative pregnancy  - CBC with platelets  - Iron and iron binding capacity  - Basic metabolic panel  (Ca, Cl, CO2, Creat, Gluc, K, Na, BUN)  - Hemoglobin A1c    Anxiety  Plan increase Effexor to 150 mg daily from 75 mg daily.  Discussed the risks and benefits but she has been tolerating well.  Discussed that we can certainly decrease again if needed.  - venlafaxine (EFFEXOR-ER) 150 MG 24 hr tablet  Dispense: 90 tablet; Refill: 0    Encounter for initial prescription of contraceptive pills  But also to the pharmacy.  She will need to keep a close eye on her blood pressure given her history of postpartum hypertension.  I have discussed this takes 10 to 14 days to become effective form of birth control.  We will get a blood pregnancy test today to ensure the patient is not pregnant given that she recently missed her menstrual cycle.  She did take a urine pregnancy test at home last week which was negative.  - levonorgestrel-ethinyl estradiol (AVIANE) 0.1-20 MG-MCG tablet  Dispense: 84 tablet; Refill: 4              Subjective:     Tammy Obregon is a 35 year old female who presents for an annual exam.  She is overall doing very well.     She is about 15 months out from a  delivery.  She has some postpartum hypertension which has resolved and she is no longer taking medication for.    She is on breast-feeding and " is hopeful to be back on birth control.    She has been having fairly normal menstrual cycles although she missed her menstrual cycle this month.  She did take a pregnancy test about a week ago which was negative.    She has a history of iron deficiency and has noted some orthostatic hypotension type symptoms with lightheadedness and is hopeful to get an iron level checked today.    Additionally, history of anxiety.  No panic attacks but just general anxiety.  She is currently taking Effexor 75 mg daily.  She feels as though this is helpful but wonders about increasing the dose of it.      Healthy Habits:   Regular Exercise: No  Sunscreen Use: Yes  Healthy Diet: yes  Dental Visits Regularly: yes  Seat Belt: Yes  Self Breast Exam Monthly: yes  Prevention of Osteoporosis: yes    Immunization History   Administered Date(s) Administered     DT (PEDS <7y) 05/01/1999     HPV Quadrivalent 12/31/2007, 02/29/2008, 07/10/2008     HepB, Unspecified 08/01/1993, 10/01/1993, 03/01/1994     Influenza Vaccine IM > 6 months Valent IIV4 10/13/2017, 09/11/2019, 10/23/2020     Influenza Vaccine, 6+MO IM (QUADRIVALENT W/PRESERVATIVES) 10/12/2018     Meningococcal (Menactra ) 09/23/2005     TDAP Vaccine (Boostrix) 12/31/2007, 04/28/2017, 02/26/2018, 11/13/2019         Gynecologic History  Patient's last menstrual period was 03/23/2021 (exact date).  Contraception: condoms  Last Pap: 2019. Results were: ASCUs with negative HPV      OB History   No obstetric history on file.       Current Outpatient Medications   Medication Sig Dispense Refill     cetirizine (ZYRTEC) 10 MG tablet        levonorgestrel-ethinyl estradiol (AVIANE) 0.1-20 MG-MCG tablet Take 1 tablet by mouth daily 84 tablet 4     Multiple Vitamins-Minerals (WOMENS MULTI VITAMIN & MINERAL) TABS        venlafaxine (EFFEXOR-ER) 150 MG 24 hr tablet Take 1 tablet (150 mg) by mouth daily 90 tablet 0     venlafaxine (EFFEXOR-XR) 75 MG 24 hr capsule Take 75 mg by mouth       No past  "medical history on file.  No past surgical history on file.  Patient has no known allergies.  No family history on file.  Social History     Socioeconomic History     Marital status:      Spouse name: Not on file     Number of children: Not on file     Years of education: Not on file     Highest education level: Not on file   Occupational History     Not on file   Social Needs     Financial resource strain: Not on file     Food insecurity     Worry: Not on file     Inability: Not on file     Transportation needs     Medical: Not on file     Non-medical: Not on file   Tobacco Use     Smoking status: Never Smoker     Smokeless tobacco: Never Used   Substance and Sexual Activity     Alcohol use: Not on file     Drug use: Not on file     Sexual activity: Not on file   Lifestyle     Physical activity     Days per week: Not on file     Minutes per session: Not on file     Stress: Not on file   Relationships     Social connections     Talks on phone: Not on file     Gets together: Not on file     Attends Jewish service: Not on file     Active member of club or organization: Not on file     Attends meetings of clubs or organizations: Not on file     Relationship status: Not on file     Intimate partner violence     Fear of current or ex partner: Not on file     Emotionally abused: Not on file     Physically abused: Not on file     Forced sexual activity: Not on file   Other Topics Concern     Not on file   Social History Narrative     Not on file       Review of Systems  12 point review of systems was completed and found to be negative except for what is been stated above.      Objective:      Vitals:    04/28/21 1443 04/28/21 1507   BP: (!) 130/90 130/84   Pulse: 68    Resp: 12    Temp: 98.5  F (36.9  C)    TempSrc: Tympanic    Weight: 97.2 kg (214 lb 6 oz)    Height: 1.626 m (5' 4\")          Physical Exam:  General Appearance: Alert, cooperative, no distress, appears stated age   Head: Normocephalic, without " obvious abnormality, atraumatic  Eyes: PERRL, conjunctiva/corneas clear, EOM's intact   Ears: Normal TM's and external ear canals, both ears  Neck: Supple, symmetrical, trachea midline, no adenopathy;  thyroid: not enlarged, symmetric, no tenderness/mass/nodules  Back: Symmetric, no curvature, ROM normal,  Lungs: Clear to auscultation bilaterally, respirations unlabored  Breasts: No breast masses, tenderness, asymmetry, or nipple discharge.  Heart: Regular rate and rhythm, S1 and S2 normal, no murmur, rub, or gallop  Abdomen: Soft, non-tender, bowel sounds active all four quadrants,  no masses, no organomegaly  Extremities: Extremities normal, atraumatic, no cyanosis or edema  Skin: Skin color, texture, turgor normal, no rashes or lesions  Lymph nodes: Cervical, supraclavicular, and axillary nodes normal and   Neurologic: Normal     Answers for HPI/ROS submitted by the patient on 4/27/2021   Annual Exam:  Frequency of exercise:: None  Getting at least 3 servings of Calcium per day:: NO  Diet:: Regular (no restrictions)  Taking medications regularly:: Yes  Medication side effects:: None  Bi-annual eye exam:: Yes  Dental care twice a year:: Yes  Sleep apnea or symptoms of sleep apnea:: Daytime drowsiness  abdominal pain: No  Blood in stool: No  Blood in urine: No  chest pain: No  chills: No  congestion: No  constipation: No  cough: No  diarrhea: No  dizziness: Yes  ear pain: No  eye pain: No  nervous/anxious: No  fever: No  frequency: No  genital sores: No  headaches: No  hearing loss: No  heartburn: No  arthralgias: No  joint swelling: No  peripheral edema: No  mood changes: No  myalgias: No  nausea: No  dysuria: No  palpitations: No  Skin sensation changes: No  sore throat: No  urgency: No  rash: No  shortness of breath: No  visual disturbance: No  weakness: No  pelvic pain: No  vaginal bleeding: No  vaginal discharge: No  tenderness: No  breast mass: No  breast discharge: No  Additional concerns today:: Yes

## 2021-04-29 NOTE — TELEPHONE ENCOUNTER
Patient's plan covers venlafaxine ER capsules.     Can therapy be changed from tablets to capsules? Pharmacy will need new Rx.

## 2021-04-30 ASSESSMENT — ANXIETY QUESTIONNAIRES
GAD7 TOTAL SCORE: 7
5. BEING SO RESTLESS THAT IT IS HARD TO SIT STILL: SEVERAL DAYS
7. FEELING AFRAID AS IF SOMETHING AWFUL MIGHT HAPPEN: SEVERAL DAYS
IF YOU CHECKED OFF ANY PROBLEMS ON THIS QUESTIONNAIRE, HOW DIFFICULT HAVE THESE PROBLEMS MADE IT FOR YOU TO DO YOUR WORK, TAKE CARE OF THINGS AT HOME, OR GET ALONG WITH OTHER PEOPLE: NOT DIFFICULT AT ALL
6. BECOMING EASILY ANNOYED OR IRRITABLE: SEVERAL DAYS
2. NOT BEING ABLE TO STOP OR CONTROL WORRYING: SEVERAL DAYS
3. WORRYING TOO MUCH ABOUT DIFFERENT THINGS: SEVERAL DAYS
1. FEELING NERVOUS, ANXIOUS, OR ON EDGE: SEVERAL DAYS

## 2021-04-30 ASSESSMENT — PATIENT HEALTH QUESTIONNAIRE - PHQ9
5. POOR APPETITE OR OVEREATING: SEVERAL DAYS
SUM OF ALL RESPONSES TO PHQ QUESTIONS 1-9: 5

## 2021-05-01 ASSESSMENT — ANXIETY QUESTIONNAIRES: GAD7 TOTAL SCORE: 7

## 2021-05-03 RX ORDER — VENLAFAXINE HYDROCHLORIDE 150 MG/1
150 CAPSULE, EXTENDED RELEASE ORAL DAILY
Qty: 90 CAPSULE | Refills: 1 | Status: SHIPPED | OUTPATIENT
Start: 2021-05-03 | End: 2022-01-27

## 2021-05-27 VITALS — SYSTOLIC BLOOD PRESSURE: 136 MMHG | DIASTOLIC BLOOD PRESSURE: 72 MMHG

## 2021-05-28 NOTE — TELEPHONE ENCOUNTER
RN cannot approve Refill Request    RN can NOT refill this medication med is not covered by policy/route to provider.       Last office visit: 11/23/2018 Skylar Arceo MD Last Physical: 4/28/2017 Last MTM visit: Visit date not found Last visit same specialty: 11/23/2018 Skylar Arceo MD.  Next visit within 3 mo: Visit date not found  Next physical within 3 mo: Visit date not found      Leyla Christianson, [facility] Triage/Med Refill 4/26/2019    Requested Prescriptions   Pending Prescriptions Disp Refills     NORLYDA 0.35 mg tablet [Pharmacy Med Name: NORLYDA 0.35MG TABLETS 28S] 84 tablet 0     Sig: TAKE 1 TABLET(0.35 MG) BY MOUTH DAILY       There is no refill protocol information for this order

## 2021-05-29 ENCOUNTER — RECORDS - HEALTHEAST (OUTPATIENT)
Dept: ADMINISTRATIVE | Facility: CLINIC | Age: 36
End: 2021-05-29

## 2021-05-30 VITALS — WEIGHT: 185.44 LBS | BODY MASS INDEX: 31.66 KG/M2 | HEIGHT: 64 IN

## 2021-05-30 NOTE — PATIENT INSTRUCTIONS - HE
Pregnancy Information    We will see you every 4 weeks until 32 weeks, every 2 weeks until 34 weeks and every week until delivery    [facility] for delivery - #[phone]     US for dates at around 8-10 weeks     Testing for trisomies (downs syndrome, trisomy 13 and trisomy 18) -    - US testing and bloodwork at [facility] ([place]) at around 13 weeks (no later than 13w6d)  OR   - Progenity genetic testing done any time after 10 weeks    US for gender and fetal survey at about 20 weeks    Blood sugar test between 24 and 28 weeks    Continue to take a prenatal vitamin - I recommend one with 800 mcg or folic acid, 27mg of elemental iron and 150 mcg of iodine     I also recommend shooting for 1,000-1,300 mg/day of Calcium (either through vitamin or diet)    Nausea in pregnancy:  I would recommend vitamin B6 for nausea.  Dosing as follows:  -Pyridoxine (B6) - 25 mg orally every six to eight hours; the maximum treatment dose suggested for pregnant women is 200 mg/day  -You can also add on Unisom (Doxyamine) - 20mg at night and up to two 10 mg doses throughout the day (for a total of up to 40mg/day) although it will likely make you drowsy

## 2021-05-30 NOTE — PROGRESS NOTES
Assessment/Plan:     Tammy Obregon is a 33 y.o. female presenting for:     Pregnancy confirmation: pregnancy test is positive today. Congratulations was given.     She is approximately 5 weeks 5 days along with an SD of 2/20/20.   We went over the complete prenatal packet in its entirety.   We discussed lifestyle modifications, safe medications in pregnancy, and foods to avoid.   We discussed trisomy testing today and she will consider - innatal packet given.   Otherwise she will follow up with me at 10 to 12 weeks for her first OB appointment.    Early US ordered to confirm dates.    Medications Discontinued During This Encounter   Medication Reason     NORLYDA 0.35 mg tablet      loratadine (CLARITIN) 10 mg tablet         25 minutes spent with this patient over half of which was spent in face-to-face counseling and coordination of care    Chief Complaint:  Chief Complaint   Patient presents with     Possible Pregnancy     LMP: 5/16/19 Two positive UPT's at home         Subjective:     Tammy Obregon is a 33 y.o. female who is a to our clinic presenting for a pregnancy confirmation appointment.  Her LMP started on 5/16/19 placing her due date on 2/20/20.  She was taking birth control she became pregnant.  She was having fairly regular menstrual cycles per her report.    This is her second pregnancy.  Her first pregnancy was uncomplicated.  She delivered at 36 weeks and 2 days.  Delivery was uncomplicated as well.    She is a month and is a bit surprised about this pregnancy but none her  were going to start trying a few months and are happy about the pregnancy.    She does have a great uncle who had Down syndrome and is considering doing the innatal prenatal testing.    Review of Systems - Negative except as above    Medications: reviewed -   Current Outpatient Medications on File Prior to Visit   Medication Sig Dispense Refill     prenat.vits,tayler,min-iron-folic Tab Take by mouth daily.        venlafaxine (EFFEXOR XR) 75 MG 24 hr capsule Take 1 capsule (75 mg total) by mouth daily. 90 capsule 1     [DISCONTINUED] loratadine (CLARITIN) 10 mg tablet Take 10 mg by mouth daily.       [DISCONTINUED] NORLYDA 0.35 mg tablet TAKE 1 TABLET(0.35 MG) BY MOUTH DAILY 84 tablet 2     No current facility-administered medications on file prior to visit.        Past medical history: reviewed -   Past Medical History:   Diagnosis Date     Migraine        Past surgical history: reviewed -   Past Surgical History:   Procedure Laterality Date     dental surgery         Family history: reviewed -   Family History   Problem Relation Age of Onset     Hyperlipidemia Father      Hearing loss Maternal Grandfather      No Medical Problems Paternal Grandmother      Hearing loss Paternal Grandfather      Breast cancer Maternal Aunt 40       Social history: reviewed -   Social History     Socioeconomic History     Marital status:      Spouse name: None     Number of children: None     Years of education: None     Highest education level: None   Occupational History     None   Social Needs     Financial resource strain: None     Food insecurity:     Worry: None     Inability: None     Transportation needs:     Medical: None     Non-medical: None   Tobacco Use     Smoking status: Never Smoker     Smokeless tobacco: Never Used   Substance and Sexual Activity     Alcohol use: No     Drug use: No     Sexual activity: Yes     Partners: Male   Lifestyle     Physical activity:     Days per week: None     Minutes per session: None     Stress: None   Relationships     Social connections:     Talks on phone: None     Gets together: None     Attends Islam service: None     Active member of club or organization: None     Attends meetings of clubs or organizations: None     Relationship status: None     Intimate partner violence:     Fear of current or ex partner: None     Emotionally abused: None     Physically abused: None     Forced  sexual activity: None   Other Topics Concern     None   Social History Narrative     None       Objective:  Vitals:    06/25/19 1538   BP: 124/78   Pulse: 84   Resp: 16   Temp: 97.9  F (36.6  C)       Vital signs reviewed and stable  General: No acute distress  Psych: Appropriate affect  HEENT: moist mucous membranes  Cardiovascular: regular rate and rhythm with no murmur  Pulmonary: clear to auscultation bilaterally with no wheeze  Abdomen: soft, non tender, non distended with normo-active bowel sounds  Extremities: warm and well perfused with no edema  Skin: warm and dry with no rash

## 2021-05-30 NOTE — TELEPHONE ENCOUNTER
New Appointment Needed  What is the reason for the visit:    First OB Appt Request  Have you had a pregnancy confirmation appointment at a [facility] primary care clinic?:  Yes: 06-25-19.  When was your positive home pregnancy test?:   date: 06-18-19    Provider Preference: PCP only  How soon do you need to be seen?: Friday 08-09-19  Waitlist offered?: No  Okay to leave a detailed message:  Yes

## 2021-05-31 VITALS — HEIGHT: 64 IN | WEIGHT: 184.44 LBS | BODY MASS INDEX: 31.49 KG/M2

## 2021-05-31 VITALS — WEIGHT: 190.44 LBS | BODY MASS INDEX: 32.43 KG/M2

## 2021-05-31 VITALS — WEIGHT: 183.19 LBS | BODY MASS INDEX: 31.2 KG/M2

## 2021-05-31 VITALS — WEIGHT: 189.25 LBS | HEIGHT: 64 IN | BODY MASS INDEX: 32.31 KG/M2

## 2021-05-31 VITALS — BODY MASS INDEX: 33.06 KG/M2 | WEIGHT: 194.13 LBS

## 2021-05-31 VITALS — HEIGHT: 64 IN | WEIGHT: 193.38 LBS | BODY MASS INDEX: 33.02 KG/M2

## 2021-05-31 NOTE — TELEPHONE ENCOUNTER
RN cannot approve Refill Request    RN can NOT refill this medication PCP messaged that patient is overdue for Labs. Last office visit: 6/25/2019 Skylar Arceo MD Last Physical: 4/28/2017 Last MTM visit: Visit date not found Last visit same specialty: 6/25/2019 Skylar Arceo MD.  Next visit within 3 mo: Visit date not found  Next physical within 3 mo: Visit date not found      Rupa Mak, [facility] Triage/Med Refill 8/13/2019    Requested Prescriptions   Pending Prescriptions Disp Refills     venlafaxine (EFFEXOR XR) 75 MG 24 hr capsule 90 capsule 1     Sig: Take 1 capsule (75 mg total) by mouth daily.       Venlafaxine/Desvenlafaxine Refill Protocol Failed - 8/12/2019  3:03 PM        Failed - Fasting lipid cascade in last year     Cholesterol   Date Value Ref Range Status   04/28/2017 200 (H) <=199 mg/dL Final     Triglycerides   Date Value Ref Range Status   04/28/2017 91 <=149 mg/dL Final     HDL Cholesterol   Date Value Ref Range Status   04/28/2017 43 (L) >=50 mg/dL Final     LDL Calculated   Date Value Ref Range Status   04/28/2017 139 (H) <=129 mg/dL Final     Patient Fasting > 8hrs?   Date Value Ref Range Status   01/30/2018 Unknown  Final             Passed - LFT or AST or ALT in last year     Albumin   Date Value Ref Range Status   11/23/2018 3.9 3.5 - 5.0 g/dL Final     Bilirubin, Total   Date Value Ref Range Status   11/23/2018 0.4 0.0 - 1.0 mg/dL Final     Alkaline Phosphatase   Date Value Ref Range Status   11/23/2018 94 45 - 120 U/L Final     AST   Date Value Ref Range Status   11/23/2018 12 0 - 40 U/L Final     ALT   Date Value Ref Range Status   11/23/2018 10 0 - 45 U/L Final     Protein, Total   Date Value Ref Range Status   11/23/2018 7.5 6.0 - 8.0 g/dL Final                Passed - PCP or prescribing provider visit in last year     Last office visit with prescriber/PCP: 6/25/2019 Skylar Arceo MD OR same dept: 6/25/2019 Skylar Arceo MD OR same  specialty: 6/25/2019 Skylar Arceo MD  Last physical: 4/28/2017 Last MTM visit: Visit date not found   Next visit within 3 mo: Visit date not found  Next physical within 3 mo: Visit date not found  Prescriber OR PCP: Skylar Arceo MD  Last diagnosis associated with med order: There are no diagnoses linked to this encounter.  If protocol passes may refill for 12 months if within 3 months of last provider visit (or a total of 15 months).             Passed - Blood Pressure in last year     BP Readings from Last 1 Encounters:   08/12/19 112/64

## 2021-05-31 NOTE — PROGRESS NOTES
"Assessment/Plan:    Tammy Obregon is a 33 y.o. female presenting for:    1. Encounter for supervision of other normal pregnancy in second trimester  No concerns  FHT heard   Will help her set up for NT testing at [facility]  - ABO/RH Typing (OP order)  - Hepatitis B Surface antigen (HBsAG)  - HIV Antigen/Antibody Screening Cascade  - HM1(CBC and Differential)  - HML Antibody Screen  - RPR  - Rubella Immune Status (IgG)  - Urinalysis Macroscopic  - Culture, Urine  - HM1 (CBC with Diff)    2. Routine general medical examination at a health care facility  - Gynecologic Cytology (PAP Smear)  - HPV High Risk DNA Cervical        There are no discontinued medications.        Chief Complaint:  Chief Complaint   Patient presents with     Initial Prenatal Visit       Subjective:   Tammy Obregon is a pleasant 33-year-old G2, P1 presenting to the clinic today at 12 weeks and 4 days gestation for prenatal care and a subsequent pregnancy.  She is overall doing well.  She did have some nausea initially but this was not as bad as with her first.    She really has no questions or concerns today.  She is hopeful to get an order for nuchal translucency testing.    Her previous pregnancy was uncomplicated.  She delivered at 36 weeks and 2 days after P PROM.    12 point review of systems completed and negative except for what has been described above    Social History     Tobacco Use   Smoking Status Never Smoker   Smokeless Tobacco Never Used       Current Outpatient Medications   Medication Sig     prenat.vits,tayler,min-iron-folic Tab Take by mouth daily.     venlafaxine (EFFEXOR XR) 75 MG 24 hr capsule Take 1 capsule (75 mg total) by mouth daily.         Objective:  Vitals:    08/12/19 1641   BP: 112/64   Pulse: 76   Resp: 16   Temp: 98.1  F (36.7  C)   TempSrc: Oral   Weight: 196 lb 8 oz (89.1 kg)   Height: 5' 4\" (1.626 m)       Body mass index is 33.73 kg/m .    Vital signs reviewed and stable  General: No acute " distress  Psych: Appropriate affect  HEENT: moist mucous membranes, pupils equal, round, reactive to light and accomodation, posterior oropharynx clear of erythema or exudate, tympanic membranes are pearly grey bilaterally  Lymph: no cervical or supraclavicular lymphadenopathy  Cardiovascular: regular rate and rhythm with no murmur  Pulmonary: clear to auscultation bilaterally with no wheeze  Abdomen: soft, non tender, non distended with normo-active bowel sounds  Extremities: warm and well perfused with no edema  Skin: warm and dry with no rash         This note has been dictated and transcribed using voice recognition software.   Any errors in transcription are unintentional and inherent to the software.

## 2021-06-01 VITALS — BODY MASS INDEX: 30.48 KG/M2 | WEIGHT: 178.5 LBS | HEIGHT: 64 IN

## 2021-06-01 VITALS — WEIGHT: 198.4 LBS | BODY MASS INDEX: 33.79 KG/M2

## 2021-06-01 VITALS — BODY MASS INDEX: 34.62 KG/M2 | WEIGHT: 203.25 LBS

## 2021-06-01 VITALS — WEIGHT: 207.7 LBS | BODY MASS INDEX: 35.37 KG/M2

## 2021-06-01 VITALS — BODY MASS INDEX: 31.17 KG/M2 | WEIGHT: 182.56 LBS | HEIGHT: 64 IN

## 2021-06-01 NOTE — PROGRESS NOTES
Tammy is a very pleasant 33-year-old female presenting at 16 weeks and 6 days gestation for supervision of a normal subsequent pregnancy in the second trimester.    She is overall feeling well.  She is having some reflux symptoms and wants to discuss what she can use today.  She will start using Zantac and let me know if she needs to switch to omeprazole.    She had her trisomy screening [facility] at [facility] which came back normal.    She is planning on finding out the gender.  Referral for her 20-week ultrasound at [facility] was given today.    Mild headaches and we discussed Tylenol and increasing fluid today.    Preservative-free influenza vaccine.  Breast pump prescription given today.

## 2021-06-02 VITALS — HEIGHT: 64 IN | BODY MASS INDEX: 31.12 KG/M2 | WEIGHT: 182.25 LBS

## 2021-06-02 NOTE — PROGRESS NOTES
Tammy is a very pleasant 33-year-old female who is a G2, P1 presenting at 20 weeks and 6 days for supervision of a normal subsequent pregnancy in the second trimester.    She is doing well.  She was having some reflux symptoms.  Tums do seem to help.  She does not think she would need a daily medication for that at this point.    She had a fetal survey at [facility].  I was able to review the report which was completely normal.  She is having a boy.    No further concerns.  She will have her 1 hour blood sugar test at her next visit.

## 2021-06-03 VITALS — HEIGHT: 64 IN | BODY MASS INDEX: 33.55 KG/M2 | WEIGHT: 196.5 LBS

## 2021-06-03 VITALS
WEIGHT: 205.19 LBS | DIASTOLIC BLOOD PRESSURE: 70 MMHG | BODY MASS INDEX: 35.22 KG/M2 | SYSTOLIC BLOOD PRESSURE: 112 MMHG

## 2021-06-03 VITALS — BODY MASS INDEX: 33.8 KG/M2 | WEIGHT: 198 LBS | HEIGHT: 64 IN

## 2021-06-03 VITALS
DIASTOLIC BLOOD PRESSURE: 72 MMHG | WEIGHT: 200.06 LBS | SYSTOLIC BLOOD PRESSURE: 116 MMHG | BODY MASS INDEX: 34.34 KG/M2

## 2021-06-03 NOTE — PROGRESS NOTES
Tammy is a very pleasant 34-year-old G2, P1 presenting to the clinic today at 25 weeks and 6 days for prenatal care and a subsequent uncomplicated pregnancy in the second trimester.    She is overall doing well.  She had her fetal survey ultrasound which was normal.    She had her 1 hour blood sugar test today and passed.  Normal hemoglobin.    She has a breast pump at home and I have given her a prescription for new one if she would like.  Otherwise, she will follow-up in 4 weeks.

## 2021-06-04 VITALS
WEIGHT: 219.31 LBS | SYSTOLIC BLOOD PRESSURE: 102 MMHG | DIASTOLIC BLOOD PRESSURE: 64 MMHG | BODY MASS INDEX: 37.64 KG/M2

## 2021-06-04 VITALS
SYSTOLIC BLOOD PRESSURE: 110 MMHG | DIASTOLIC BLOOD PRESSURE: 72 MMHG | HEART RATE: 100 BPM | WEIGHT: 218.3 LBS | BODY MASS INDEX: 37.47 KG/M2

## 2021-06-04 VITALS — BODY MASS INDEX: 38.62 KG/M2 | DIASTOLIC BLOOD PRESSURE: 76 MMHG | WEIGHT: 225 LBS | SYSTOLIC BLOOD PRESSURE: 120 MMHG

## 2021-06-04 VITALS
DIASTOLIC BLOOD PRESSURE: 84 MMHG | SYSTOLIC BLOOD PRESSURE: 130 MMHG | HEIGHT: 64 IN | BODY MASS INDEX: 32.79 KG/M2 | RESPIRATION RATE: 12 BRPM | WEIGHT: 192.06 LBS | TEMPERATURE: 97.9 F | HEART RATE: 68 BPM

## 2021-06-04 VITALS
BODY MASS INDEX: 36.27 KG/M2 | DIASTOLIC BLOOD PRESSURE: 58 MMHG | WEIGHT: 211.31 LBS | SYSTOLIC BLOOD PRESSURE: 110 MMHG

## 2021-06-04 VITALS — BODY MASS INDEX: 39 KG/M2 | SYSTOLIC BLOOD PRESSURE: 112 MMHG | WEIGHT: 227.19 LBS | DIASTOLIC BLOOD PRESSURE: 70 MMHG

## 2021-06-04 VITALS
BODY MASS INDEX: 39.58 KG/M2 | WEIGHT: 230.56 LBS | DIASTOLIC BLOOD PRESSURE: 78 MMHG | SYSTOLIC BLOOD PRESSURE: 122 MMHG

## 2021-06-04 VITALS — HEIGHT: 64 IN | BODY MASS INDEX: 39.61 KG/M2 | WEIGHT: 232 LBS

## 2021-06-04 VITALS — SYSTOLIC BLOOD PRESSURE: 132 MMHG | BODY MASS INDEX: 39.82 KG/M2 | DIASTOLIC BLOOD PRESSURE: 82 MMHG | WEIGHT: 232 LBS

## 2021-06-04 VITALS
SYSTOLIC BLOOD PRESSURE: 100 MMHG | DIASTOLIC BLOOD PRESSURE: 64 MMHG | WEIGHT: 212.38 LBS | BODY MASS INDEX: 36.45 KG/M2

## 2021-06-04 NOTE — PROGRESS NOTES
Stephanie is a very pleasant 34-year-old G2, P1 presenting to the clinic today at 32 weeks and 5 days gestation for prenatal care and a subsequent uncomplicated pregnancy in the third trimester.    She is overall doing very well.  She has got her breast pump prescription.  Her first baby delivered at slightly over 36 weeks.  She had P PROM but that went into labor on her own.    She will follow-up in 2 weeks at which point we can do a group B strep testing given her early labor last time.  She plans on [facility]. she plans on epidural.

## 2021-06-04 NOTE — PROGRESS NOTES
Tammy is a very pleasant 34-year-old G2, P1 presenting to the clinic today at 29 weeks 6 days gestation for prenatal care and a subsequent uncomplicated pregnancy in the third trimester.    She is overall doing well.  Fetal survey, 1 hour blood sugar test and hemoglobin are normal.  Breast pump prescription given.    First baby delivered at slightly over 26 weeks.  Water P PROM but went into labor on her own.    She will follow-up with me in 2 to 3 weeks (I am off for a week around [place]).  She plans [facility].  She plans an epidural.

## 2021-06-05 NOTE — PROGRESS NOTES
Stephanie is a very pleasant 34-year-old G2, P1 presenting to the clinic today at 37 weeks and 1 day gestation for prenatal care and a subsequent uncomplicated pregnancy in the third trimester.    Doing well.  Good fetal movement.  Starting to get fairly uncomfortable.  Swelling throughout the day but better in the morning.  She has not been wearing compression stockings but is going to consider doing so.    In her first delivery she had PPROM at 36 weeks and 3 days and delivered at 36 weeks and 4 days after she went into labor spontaneously.  Group B strep test was positive and she will need antibiotics in labor and we discussed that again today.    No further concerns today.  Follow-up in 1 week.  Urine analysis will be was normal

## 2021-06-05 NOTE — PROGRESS NOTES
Tammy is a very pleasant 34-year-old G2, P1 presenting to the clinic today at 38 weeks and 1 day gestation for prenatal care and a subsequent uncomplicated pregnancy in the third trimester.  She is overall doing well.  She has really no questions or concerns today.    Some [place] contractions.  She delivered her first at 36 weeks and 3 days after PPROM and spontaneous labor.    Group B strep test was positive.  She will follow-up next Wednesday.

## 2021-06-05 NOTE — PROGRESS NOTES
Tammy is a very pleasant 34-year-old G2, P1 presenting to the clinic today at 36 weeks and 1 day gestation for prenatal care and a subsequent uncomplicated pregnancy in the third trimester.    She is doing very well overall.  Some difficulty sleeping.  Good fetal movement.  Group B strep test was positive and thus she will need antibiotics in labor and we discussed that today.    In her first delivery she had PPROM at 36 weeks and 3 days and delivered at 36 weeks and 4 days after she went into labor spontaneously.    Follow-up next week.

## 2021-06-05 NOTE — PROGRESS NOTES
Tammy is a very pleasant 34-year-old G2, P1 presenting to the clinic today at 34 weeks and 4 days gestation for prenatal care and a subsequent uncomplicated pregnancy in the third trimester.    She is overall doing well.  Good fetal movement.  She is having some [place] contractions particularly when she is sitting for long time at work.    In her first delivery she had P PROM at 36 and 3 and delivered at 36 and 4 weeks after she went into labor spontaneously on her own.    Group B strep test was done today given her history of late  delivery.  She plans on [facility].  She plans on an epidural.  Discussed either taking prenatal or  vitamins after delivery if she is planning on breast-feeding.    Follow-up in 2 weeks and then weekly thereafter.

## 2021-06-05 NOTE — TELEPHONE ENCOUNTER
RN cannot approve Refill Request    RN can NOT refill this medication Protocol failed and NO refill given.      Leyla Christianson, [facility] Triage/Med Refill 2/6/2020    Requested Prescriptions   Pending Prescriptions Disp Refills     venlafaxine (EFFEXOR XR) 75 MG 24 hr capsule 90 capsule 1     Sig: Take 1 capsule (75 mg total) by mouth daily.       Venlafaxine/Desvenlafaxine Refill Protocol Failed - 2/6/2020  2:49 PM        Failed - LFT or AST or ALT in last year     Albumin   Date Value Ref Range Status   11/23/2018 3.9 3.5 - 5.0 g/dL Final     Bilirubin, Total   Date Value Ref Range Status   11/23/2018 0.4 0.0 - 1.0 mg/dL Final     Alkaline Phosphatase   Date Value Ref Range Status   11/23/2018 94 45 - 120 U/L Final     AST   Date Value Ref Range Status   11/23/2018 12 0 - 40 U/L Final     ALT   Date Value Ref Range Status   11/23/2018 10 0 - 45 U/L Final     Protein, Total   Date Value Ref Range Status   11/23/2018 7.5 6.0 - 8.0 g/dL Final                Failed - Fasting lipid cascade in last year     Cholesterol   Date Value Ref Range Status   04/28/2017 200 (H) <=199 mg/dL Final     Triglycerides   Date Value Ref Range Status   04/28/2017 91 <=149 mg/dL Final     HDL Cholesterol   Date Value Ref Range Status   04/28/2017 43 (L) >=50 mg/dL Final     LDL Calculated   Date Value Ref Range Status   04/28/2017 139 (H) <=129 mg/dL Final     Patient Fasting > 8hrs?   Date Value Ref Range Status   11/13/2019 No  Final             Passed - PCP or prescribing provider visit in last year     Last office visit with prescriber/PCP: 6/25/2019 Skylar Arceo MD OR same dept: 6/25/2019 Skylar Arceo MD OR same specialty: 6/25/2019 Skylar Arceo MD  Last physical: 4/28/2017 Last MTM visit: Visit date not found   Next visit within 3 mo: Visit date not found  Next physical within 3 mo: Visit date not found  Prescriber OR PCP: Skylar Arceo MD  Last diagnosis associated with med order: 1.  Anxiety  - venlafaxine (EFFEXOR XR) 75 MG 24 hr capsule; Take 1 capsule (75 mg total) by mouth daily.  Dispense: 90 capsule; Refill: 1    If protocol passes may refill for 12 months if within 3 months of last provider visit (or a total of 15 months).             Passed - Blood Pressure in last year     BP Readings from Last 1 Encounters:   01/31/20 112/70

## 2021-06-06 NOTE — ANESTHESIA PROCEDURE NOTES
Epidural Block    Patient location during procedure: OB    Reason for Block:labor epidural  Staffing:  Performing  Anesthesiologist: Lissette Mai MD  Preanesthetic Checklist  Completed: patient identified, risks, benefits, and alternatives discussed, timeout performed, consent obtained, airway assessed, oxygen available, suction available, emergency drugs available and hand hygiene performed  Procedure  Patient position: sitting  Prep: ChloraPrep  Patient monitoring: continuous pulse oximetry, heart rate and blood pressure  Approach: midline  Location: L3-L4  Injection technique: SIDDHARTH saline  Number of Attempts:1  Needle  Needle type: Tuohy   Needle gauge: 18 G     Catheter in Space: 5 (SIDDHARTH 7, taped 12)  Assessment  Sensory level:  No complications

## 2021-06-06 NOTE — ANESTHESIA PREPROCEDURE EVALUATION
Anesthesia Evaluation      No history of anesthetic complications     Airway   Mallampati: III   Pulmonary    (-) asthma                         Cardiovascular   (+) hypertension well controlled, ,        ROS comment: Pre-e labs wnl     Neuro/Psych - negative ROS     Endo/Other    (+) pregnant     Comments: g2 in active labor requeating labor epidural      GI/Hepatic/Renal - negative ROS   (-) no hiatal hernia          Dental                         Anesthesia Plan  Planned anesthetic: epidural and spinal  Patient requests labor epidural. Chart reviewed, including labs. Reviewed options and risks with the patient, including but not limited to: bleeding, infection, damage to tissues under the skin (nerves, muscles, blood vessels), hypotension, headache, and epidural failure. Questions answered, consent signed. Patient agrees to elective labor epidural.     Epidural not functioning will proceed with spinal.     ASA 3 - emergent     Anesthetic plan and risks discussed with: patient and spouse    Post-op plan: epidural analgesia

## 2021-06-06 NOTE — ANESTHESIA PROCEDURE NOTES
Spinal Block    Patient location during procedure: OB  Start time: 2/16/2020 2:45 PM  End time: 2/16/2020 2:41 PM  Reason for block: at surgeon's request and primary anesthetic    Staffing:  Performing  Anesthesiologist: Arline Hatch MD    Preanesthetic Checklist  Completed: patient identified, risks, benefits, and alternatives discussed, timeout performed, consent obtained, airway assessed, oxygen available, suction available, emergency drugs available and hand hygiene performed  Spinal Block  Patient position: sitting  Prep: ChloraPrep and site prepped and draped  Patient monitoring: heart rate, cardiac monitor, continuous pulse ox and blood pressure  Approach: midline  Location: L3-4  Injection technique: single-shot

## 2021-06-06 NOTE — ANESTHESIA PROCEDURE NOTES
Epidural Block    Patient location during procedure: OB  Time Called: 2/16/2020 9:17 AM  Reason for Block:labor epidural  Staffing:  Performing  Anesthesiologist: Arline Hatch MD  Preanesthetic Checklist  Completed: patient identified, risks, benefits, and alternatives discussed, timeout performed, consent obtained, at patient's request, airway assessed, oxygen available, suction available, emergency drugs available and hand hygiene performed  Procedure  Patient position: sitting  Prep: ChloraPrep and site prepped and draped  Patient monitoring: continuous pulse oximetry, heart rate and blood pressure  Approach: midline  Location: L2-L3  Injection technique: SIDDHARTH saline  Number of Attempts:1  Needle  Needle type: Satnam   Needle gauge: 18 G     Catheter in Space: 6  Assessment  Sensory level:  No complications

## 2021-06-06 NOTE — ANESTHESIA POSTPROCEDURE EVALUATION
Patient: Tammy Obregon  Procedure(s):   SECTION  Anesthesia type: spinal    Patient location: Labor and Delivery  Last vitals:   Vitals Value Taken Time   /74 2020 12:00 PM   Temp 36.8  C (98.2  F) 2020 12:00 PM   Pulse 84 2020 12:00 PM   Resp 16 2020 12:00 PM   SpO2 98 % 2020 12:00 PM     Post vital signs: stable  Level of consciousness: awake and responds to simple questions  Post-anesthesia pain: pain controlled  Post-anesthesia nausea and vomiting: no  Pulmonary: unassisted, return to baseline  Cardiovascular: stable and blood pressure at baseline  Hydration: adequate  Anesthetic events: no    QCDR Measures:  ASA# 11 - Ivone-op Cardiac Arrest: ASA11B - Patient did NOT experience unanticipated cardiac arrest  ASA# 12 - Ivone-op Mortality Rate: ASA12B - Patient did NOT die  ASA# 13 - PACU Re-Intubation Rate: NA - No ETT / LMA used for case  ASA# 10 - Composite Anes Safety: ASA10A - No serious adverse event    Additional Notes:

## 2021-06-06 NOTE — PATIENT INSTRUCTIONS - HE
"1. Depo-provera Shot - this is a shot your receive in your arm here in clinic once every three months (at a \"nurse only\" appointment).  It is progeserone only so there is not really the increased risk for blood clots as there are in the combination (estrogen/progesterone) medications.  It is nice as you only have to remember to do something 4 times a year.  Some people have noted some weight gain with this form of contraception but most women do not have issues.  Many times your period will stop entirely but sometimes women will have spotting which can be annoying (particularily for the first three months).      2. Nexplanon arm implant - a progesterone only arm implant (small jeffrey) that is an effective form of birth control for three years.  It is inserted via a needle here in clinic.  Most people tolerate the procedure very well.  Menstrual spotting is very common for the first few months after insertion but often your period stops which is a nice benefit.  It can be removed at any time if you decide you would like to become pregnant.     3. Intrauterine Device (IUD) - and IUD is a device that is inserted into your uterus here in clinic.  The procedure can be a bit crampy (generally pretty well tolerated if you have had a baby vaginally)  There are two types available      - Mirena IUD - progesterone only birth control that lasts for 5 years.  Like the Nexplanon  menstrual spotting can occur for the first few months after insertion but often your period stops which is a nice benefit.  It can be removed at any time if you decide you would like to become pregnant.     - Paraguard IUD (copper IUD) - non hormonal IUD that lasts 10 years.  This is a great option for those who do not tolerate hormonal contraceptives.  It can make your periods a little heavier and is probably not a great choice for women who already have heavy cycles.    "

## 2021-06-06 NOTE — PROGRESS NOTES
Tammy is a very pleasant 34-year-old G2, P1 presenting to the clinic today at 38 weeks and 6 days gestation for prenatal care and a subsequent uncomplicated infections.    She is fairly uncomfortable.  She delivered her first at 36 weeks and 4 days and thus was hoping to have been delivered by now.  She states that she feels much larger than she did previously.    Blood pressure initially was in the low 140s but on recheck was 132/82.  She will return to clinic in 2 days for a repeat blood pressure check at a nurse only appointment as I am not here in clinic.  If blood pressure is elevated would have her present to labor and delivery for further evaluation and plans for induction for gestational hypertension.

## 2021-06-06 NOTE — PROGRESS NOTES
I met with Tammy Obregon at the request of Dr Arceo to recheck her blood pressure.  Blood pressure medications on the MAR were reviewed with patient.    Patient has taken all medications as per usual regimen: Yes  Patient reports tolerating them without any issues or concerns: Yes    Vitals:    02/14/20 1643   BP: 136/72       Blood pressure was taken, previous encounter was reviewed, recorded blood pressure below 140/90.  Patient was discharged and the note will be sent to the provider for final review.

## 2021-06-06 NOTE — ANESTHESIA CARE TRANSFER NOTE
Last vitals:   Vitals:    02/16/20 1357   BP:    Pulse: (!) 119   Resp:    Temp:    SpO2: 98%     Patient's level of consciousness is drowsy  Spontaneous respirations: yes  Maintains airway independently: yes  Dentition unchanged: yes  Oropharynx: oropharynx clear of all foreign objects    QCDR Measures:  ASA# 20 - Surgical No data recorded  PQRS# 430 - Adult PONV Prevention: 4558F - Pt received => 2 anti-emetic agents (different classes) preop & intraop  ASA# 8 - Peds PONV Prevention: NA - Not pediatric patient, not GA or 2 or more risk factors NOT present  PQRS# 424 - Ivone-op Temp Management: 4559F - At least one body temp DOCUMENTED => 35.5C or 95.9F within required timeframe  PQRS# 426 - PACU Transfer Protocol: - Transfer of care checklist used  ASA# 14 - Acute Post-op Pain: ASA14B - Patient did NOT experience pain >= 7 out of 10

## 2021-06-07 NOTE — PROGRESS NOTES
Assessment:        Tammy Obregon is a 34 y.o. female here for postpartum exam at 4 weeks postpartum.      1. Encounter for postpartum visit     2. Postpartum hypertension  labetaloL (NORMODYNE) 200 MG tablet   . .    Plan:        1. Postpartum check  2. Contraception: discussed several forms - will contact me with decision - condoms in the meantime  3.  Postpartum hypertension: Blood pressures are still borderline.  Will recheck in 1 month at her son's 2-month well-child check.  Continue labetalol for this time.  If she takes her blood pressures at home and they are returning to normal she can start breaking the tablet in half.    Subjective:       Tammy Obregon is a 34 y.o. female who presents for a postpartum visit. She is 4 weeks postpartum following a low cervical transverse  section. I have fully reviewed the prenatal and intrapartum course. The delivery was at 39 gestational weeks. Outcome: primary  section, low transverse incision. Postpartum course has been uncomplicated. Baby's course has been uncomplicated (NICU for several days due to respiratory failure at birth). Baby is feeding by both breast and bottle - .. Bled for  2 weeks postpartum.  She is currently experiencing  staining only. Bowel function is normal. Bladder function is normal. Patient has not resumed intercourse. Contraception method is abstinence. Postpartum depression screening score: 0     The following portions of the patient's history were reviewed and updated as appropriate: allergies, current medications and problem list    Review of Systems  General:  Denies problem  Eyes: Denies problem  Ears/Nose/Throat: Denies problem  Cardiovascular: Denies problem  Respiratory:  Denies problem  Gastrointestinal:  Denies problem, Genitourinary: Denies problem  Musculoskeletal:  Denies problem  Skin: Denies problem  Neurologic: Denies problem  Psychiatric: Denies problem  Endocrine: Denies problem  Heme/Lymphatic: Denies  "problem   Allergic/Immunologic: Denies problem          Objective:         Vitals:    03/18/20 1416   BP: 130/84   Pulse: 68   Resp: 12   Temp: 97.9  F (36.6  C)   TempSrc: Oral   Weight: 192 lb 1 oz (87.1 kg)   Height: 5' 4\" (1.626 m)       Physical Exam:  General Appearance: Alert, cooperative, no distress, appears stated age  Head: Normocephalic, without obvious abnormality, atraumatic  Eyes: PERRL, conjunctiva/corneas clear, EOM's intact  Throat: Lips, mucosa, and tongue normal  Neck: Supple, symmetrical, trachea midline, no adenopathy;  thyroid: not enlarged   Lungs: Clear to auscultation bilaterally, respirations unlabored  Breasts: No breast masses, tenderness, asymmetry, or nipple discharge.  Heart: Regular rate and rhythm, S1 and S2 normal   Abdomen: Soft, non-tender, uterus firm  Extremities: Extremities normal, atraumatic, no cyanosis or edema  Skin: Skin color, texture, turgor normal, no rashes or lesions  Lymph nodes: Cervical, supraclavicular, and axillary nodes normal  Neurologic: Normal        "

## 2021-06-07 NOTE — TELEPHONE ENCOUNTER
Dr. Arceo-  See [employer] message and reply via [employer].  Refill for labetalol queued for MD approval if appropriate.  I can let her know she can email me the  form for Brigette

## 2021-06-10 NOTE — PROGRESS NOTES
Assessment/Plan:     Health maintenance female exam.  All questions answered.  Await pap smear results.  Breast self exam technique reviewed and patient encouraged to perform self-exam monthly.  Discussed healthy lifestyle modifications.  Await fasting lab results  The following high BMI interventions were performed this visit: encouragement to exercise and weight monitoring    1. Healthcare maintenance  Doing well overall - cholesterol was a little elevated two years ago - will recheck  - Gynecologic Cytology (PAP Smear)  - Lipid Cascade FASTING  - Glucose  - Hemoglobin  - Tdap vaccine,  8yo or older,  IM    2. Encounter for contraceptive management, unspecified contraceptive encounter type  She may desire pregnancy within the next year or so.  Until then she will continue taking her birth control.  - norethindrone (JOSSE-BE) 0.35 mg tablet; TAKE 1 TABLET BY MOUTH DAILY  Dispense: 84 tablet; Refill: 4    3. Anxiety  She would like to be started back on her Effexor.  This will be sent to the pharmacy today.  - venlafaxine (EFFEXOR XR) 37.5 MG 24 hr capsule; Take 1 capsule (37.5 mg total) by mouth daily.  Dispense: 90 capsule; Refill: 2    4. Acute vaginitis  She is not having any symptoms.  On examination appears as though she may have a mild yeast infection.  Swab done.  - Wet Prep, Vaginal          Subjective:      Tammy Obregon is a 31 y.o. female who presents for an annual exam.  She is overall doing well.  Her and her  of 3 years recently built a home and he will go.  They moved in January sore just getting settled in.  She was hoping to become pregnant however they have decided to hold off for another few months.  She would like a refill of her birth control today.    She weaned off her Effexor about 6 months ago.  She feels as though she is getting fairly anxious.  She has been having OCD tendencies as well which have been getting in the way of her life.  She is hopeful to start back on a  low-dose.    Healthy Habits:   Regular Exercise: Yes  Sunscreen Use: Yes  Healthy Diet: Yes  Dental Visits Regularly: Yes  Seat Belt: Yes  Sexually active: Yes  Self Breast Exam Monthly:No  Colonoscopy: N/A  Prevention of Osteoporosis: Yes  Last Dexa: N/A    Immunization History   Administered Date(s) Administered     DT (pediatric) 05/01/1999     HPV Quadrivalent 12/31/2007, 02/29/2008, 07/10/2008     Hep B, historic 08/01/1993, 10/01/1993, 03/01/1994     Meningococcal MCV4P 09/23/2005     Td, historic 12/31/2007     Tdap 12/31/2007     Immunization status: due today.    Gynecologic History  Patient's last menstrual period was 04/02/2017 (approximate).  Contraception: oral progesterone-only contraceptive  Last Pap: 2014. Results were: normal      OB History   No data available       Current Outpatient Prescriptions   Medication Sig Dispense Refill     cetirizine (ZYRTEC) 10 MG tablet Take 10 mg by mouth daily.       CHOLECALCIFEROL, VITAMIN D3, (VITAMIN D3 ORAL) Take by mouth.       CYANOCOBALAMIN, VITAMIN B-12, (VITAMIN B-12 ORAL) Take 2,500 mcg by mouth.       fluticasone (FLONASE) 50 mcg/actuation nasal spray 2 sprays into each nostril daily.       ibuprofen (ADVIL,MOTRIN) 200 MG tablet Take 400 mg by mouth as needed.       ketotifen (WAL-ZYR, KETOTIFEN,) 0.025 % ophthalmic solution        MULTIVIT WITH CALCIUM,IRON,MIN (WOMEN'S DAILY MULTIVITAMIN ORAL) Take by mouth.       norethindrone (JOSSE-BE) 0.35 mg tablet TAKE 1 TABLET BY MOUTH DAILY 84 tablet 4     venlafaxine (EFFEXOR XR) 37.5 MG 24 hr capsule Take 1 capsule (37.5 mg total) by mouth daily. 90 capsule 2     No current facility-administered medications for this visit.      Past Medical History:   Diagnosis Date     Migraine      Past Surgical History:   Procedure Laterality Date     dental surgery       Review of patient's allergies indicates no known allergies.  Family History   Problem Relation Age of Onset     Hyperlipidemia Father      Hearing loss  "Maternal Grandfather      No Medical Problems Paternal Grandmother      Hearing loss Paternal Grandfather      Breast cancer Maternal Aunt 40     Social History     Social History     Marital status: Single     Spouse name: N/A     Number of children: N/A     Years of education: N/A     Occupational History     Not on file.     Social History Main Topics     Smoking status: Never Smoker     Smokeless tobacco: Not on file     Alcohol use Not on file     Drug use: Not on file     Sexual activity: Not on file     Other Topics Concern     Not on file     Social History Narrative       Review of Systems  General:  Denies problems  Eyes:  Denies problems  Ears/Nose/Throat:  Denies problems  Cardiovascular:  Denies problems  Respiratory:  Denies problems  Gastrointestinal:  Denies problems  Genitourinary:  Denies problems  Musculoskeletal:  Denies problems  Skin:  Denies problems, Neurologic:  Denies problems  Psychiatric:  Denies problems  Endocrine:  Denies problems  Heme/Lymphatic:  Denies problems  Allergic/Immunologic:  Denies problems       Objective:         Vitals:    04/28/17 0802   BP: 108/72   Pulse: 76   Resp: 16   Temp: 98.8  F (37.1  C)   TempSrc: Oral   Weight: 185 lb 7 oz (84.1 kg)   Height: 5' 4.25\" (1.632 m)       Physical Exam:  General Appearance: Alert, cooperative, no distress, appears stated age   Head: Normocephalic, without obvious abnormality, atraumatic  Eyes: PERRL, conjunctiva/corneas clear, EOM's intact   Ears: Normal TM's and external ear canals, both ears  Nose:Nares normal, septum midline,mucosa normal, no drainage    Throat:Lips, mucosa, and tongue normal; teeth and gums normal  Neck: Supple, symmetrical, trachea midline, no adenopathy;  thyroid: not enlarged, symmetric, no tenderness/mass/nodules  Back: Symmetric, no curvature, ROM normal,  Lungs: Clear to auscultation bilaterally, respirations unlabored  Breasts: No breast masses, tenderness, asymmetry, or nipple discharge.  Heart: " Regular rate and rhythm, S1 and S2 normal, no murmur, rub, or gallop  Abdomen: Soft, non-tender, bowel sounds active all four quadrants,  no masses, no organomegaly  Pelvic:normal external female genitalia with mild vaginitis and some white chunky non-foul-smelling discharge, normal appearing vaginal mucosa and cervix  Extremities: Extremities normal, atraumatic, no cyanosis or edema  Skin: Skin color, texture, turgor normal, no rashes or lesions  Lymph nodes: Cervical, supraclavicular, and axillary nodes normal and   Neurologic: Normal

## 2021-06-12 NOTE — PROGRESS NOTES
Assessment / Impression     1. Nausea  Comprehensive Metabolic Panel    HM2(CBC w/o Differential)    Pregnancy (Beta-hCG, Qual), Urine   2. Vomiting  Comprehensive Metabolic Panel    HM2(CBC w/o Differential)    Pregnancy (Beta-hCG, Qual), Urine   3. Fever  Rapid Strep A Screen-Throat    Group A Strep, RNA Direct Detection, Throat   4. Positive urine pregnancy test  US OB < 14 Weeks With Transvaginal         Plan:     Her urine pregnancy test came back positive.  This is her first pregnancy.  I explained that this is why she has been feeling the nausea and vomiting symptoms.  Sounds like she may have an underlying cold this past week as well.  This is an unplanned, but not unwanted pregnancy. I recommended that she stop the oral contraceptive pill and continue the prenatal vitamins. I ordered a first trimester ultrasound since she is unsure of her LMP.  Based on an LMP in late July, she is approximately 7 weeks pregnant at this point.  I recommended she schedule an OB appointment with her PCP in the next 2 weeks.  She was given a prescription for Zofran to use as needed for nausea symptoms.  Her rapid strep test is negative.  This will be sent for culture.  Her hemogram is normal.  The CMP is pending.  This was a 25 minute appointment and greater than 50% of the time was spent in counseling and care coordination.    Subjective:      HPI: Tammy Obregon is a 32 y.o. female who resents to the clinic to be evaluated for nausea and vomiting symptoms that started last week.  She describes having cold symptoms with a runny nose, fever and cough starting on 9/5.  She reports that her  was sick with similar symptoms that included a sore throat.  He has been feeling better.  Her cold symptoms have improved, but they have not resolved yet.  She is still feeling nauseous but has not vomited in a few days.  She is currently on normal contraceptive pill for birth control.  She reports changing to a different OCP last  "month.  She has not missed doses.  She cannot remember exactly when her last menstrual period was, but she thinks it was sometime in late July.        Review of Systems  All other systems reviewed and are negative.     History   Smoking Status     Never Smoker   Smokeless Tobacco     Not on file       Family History   Problem Relation Age of Onset     Hyperlipidemia Father      Hearing loss Maternal Grandfather      No Medical Problems Paternal Grandmother      Hearing loss Paternal Grandfather      Breast cancer Maternal Aunt 40       Objective:     /74 (Patient Site: Left Arm, Patient Position: Sitting, Cuff Size: Adult Regular)  Pulse 100  Temp 98.3  F (36.8  C) (Oral)   Resp 16  Ht 5' 4.25\" (1.632 m)  Wt 189 lb 4 oz (85.8 kg)  LMP 07/24/2017 (LMP Unknown)  Breastfeeding? No Comment: unsure  BMI 32.23 kg/m2  Physical Examination: General appearance - alert, well appearing, and in no distress  Eyes: pupils equal and reactive, extraocular eye movements intact  Ears: Tympanic membranes clear bilaterally  Mouth: mucous membranes moist, pharynx normal without lesions  Neck: supple, no significant adenopathy  Lungs: clear to auscultation, no wheezes, rales or rhonchi, symmetric air entry  Heart: normal rate, regular rhythm, normal S1, S2, no murmurs, rubs, clicks or gallops  Abdomen: soft, nontender, nondistended, no masses or organomegaly  Neurological: alert, oriented, normal speech, no focal findings or movement disorder noted.    Extremities: No edema, no clubbing or cyanosis      Recent Results (from the past 168 hour(s))   HM2(CBC w/o Differential)   Result Value Ref Range    WBC 9.7 4.0 - 11.0 thou/uL    RBC 4.46 3.80 - 5.40 mill/uL    Hemoglobin 13.1 12.0 - 16.0 g/dL    Hematocrit 39.3 35.0 - 47.0 %    MCV 88 80 - 100 fL    MCH 29.4 27.0 - 34.0 pg    MCHC 33.3 32.0 - 36.0 g/dL    RDW 11.3 11.0 - 14.5 %    Platelets 338 140 - 440 thou/uL    MPV 7.6 7.0 - 10.0 fL   Pregnancy (Beta-hCG, Qual), Urine "   Result Value Ref Range    Pregnancy Test, Urine Positive (!) Negative   Rapid Strep A Screen-Throat   Result Value Ref Range    Rapid Strep A Antigen No Group A Strep detected, presumptive negative No Group A Strep detected, presumptive negative       Current Outpatient Prescriptions   Medication Sig     cetirizine (ZYRTEC) 10 MG tablet Take 10 mg by mouth daily.     CHOLECALCIFEROL, VITAMIN D3, (VITAMIN D3 ORAL) Take by mouth.     CYANOCOBALAMIN, VITAMIN B-12, (VITAMIN B-12 ORAL) Take 2,500 mcg by mouth.     fluticasone (FLONASE) 50 mcg/actuation nasal spray 2 sprays into each nostril daily.     ibuprofen (ADVIL,MOTRIN) 200 MG tablet Take 400 mg by mouth as needed.     ketotifen (WAL-ZYR, KETOTIFEN,) 0.025 % ophthalmic solution      MULTIVIT WITH CALCIUM,IRON,MIN (WOMEN'S DAILY MULTIVITAMIN ORAL) Take by mouth.     norethindrone (JOSSE-BE) 0.35 mg tablet TAKE 1 TABLET BY MOUTH DAILY     venlafaxine (EFFEXOR XR) 37.5 MG 24 hr capsule Take 1 capsule (37.5 mg total) by mouth daily.     ondansetron (ZOFRAN, AS HYDROCHLORIDE,) 4 MG tablet Take 1 tablet (4 mg total) by mouth every 8 (eight) hours as needed for nausea.

## 2021-06-13 NOTE — PROGRESS NOTES
Assessment/Plan:    Tammy Obregon is a 32 y.o. female presenting for:    1. 11 weeks gestation of pregnancy  Still with some mild nausea - continue vitamin B6 and unisom    We went over the full prenatal packet today.  All questions were answered.    She will discuss trisomy testing with her  and let me know within 1 week if she would like to proceed.  She understands that this needs to be done at the latest by 14 weeks.  - ABO/RH Typing (OP order)  - Hepatitis B Surface antigen (HBsAG)  - HIV Antigen/Antibody Screening Cascade  - HM1(CBC and Differential)  - HML Antibody Screen  - RPR  - Rubella Immune Status (IgG)  - Urinalysis Macroscopic  - Culture, Urine  - HM1 (CBC with Diff)        Medications Discontinued During This Encounter   Medication Reason     norethindrone (JOSSE-BE) 0.35 mg tablet Therapy completed     norethindrone (JOSSE-BE) 0.35 mg tablet Therapy completed     MULTIVIT WITH CALCIUM,IRON,MIN (WOMEN'S DAILY MULTIVITAMIN ORAL) Therapy completed     ketotifen (WAL-ZYR, KETOTIFEN,) 0.025 % ophthalmic solution Therapy completed     ibuprofen (ADVIL,MOTRIN) 200 MG tablet Therapy completed     fluticasone (FLONASE) 50 mcg/actuation nasal spray Therapy completed     cetirizine (ZYRTEC) 10 MG tablet Therapy completed           Chief Complaint:  Chief Complaint   Patient presents with     Routine Prenatal Visit     1st OB Check       Subjective:   Tammy Obregon is a very pleasant 32-year-old female presenting to the clinic today for a first OB appointment.  Patient had a pregnancy confirmation appointment last month.  An ultrasound at that time was done which showed she was 6 weeks and 3 days along placing her due date on May 7, 2018.  She is 10 weeks and 4 days today.  She is overall doing well.  She was somewhat nauseated and is taking vitamin B6 and Unisom which has been helping.  She is only had a few episodes of actual vomiting.    12 point review of systems completed and negative except  "for what has been described above    History   Smoking Status     Never Smoker   Smokeless Tobacco     Not on file       Current Outpatient Prescriptions   Medication Sig Note     CHOLECALCIFEROL, VITAMIN D3, (VITAMIN D3 ORAL) Take by mouth.      CYANOCOBALAMIN, VITAMIN B-12, (VITAMIN B-12 ORAL) Take 2,500 mcg by mouth.      doxylamine (UNISOM, DOXYLAMINE,) 25 mg tablet  10/13/2017: I cut this pill in half; taken as needed, one-half pill at night Received from: Patient     PRENATAL VIT 91/IRON/FOLIC/DHA (PRENATAL + DHA ORAL)  10/13/2017: Nature Made Prenatal Multi + DHA     pyridoxine, vitamin B6, (VITAMIN B-6) 25 MG tablet  10/13/2017: Taken as needed; I cut a 100 mg pill in quarters. Received from: Patient     venlafaxine (EFFEXOR XR) 37.5 MG 24 hr capsule Take 1 capsule (37.5 mg total) by mouth daily.      ondansetron (ZOFRAN, AS HYDROCHLORIDE,) 4 MG tablet Take 1 tablet (4 mg total) by mouth every 8 (eight) hours as needed for nausea.          Objective:  Vitals:    10/13/17 0837   BP: 108/70   Pulse: 88   Resp: 16   Temp: 97.7  F (36.5  C)   TempSrc: Oral   Weight: 184 lb 7 oz (83.7 kg)   Height: 5' 4.25\" (1.632 m)       Vital signs reviewed and stable  See exam below     This note has been dictated and transcribed using voice recognition software.   Any errors in transcription are unintentional and inherent to the software.        "

## 2021-06-14 NOTE — TELEPHONE ENCOUNTER
RN cannot approve Refill Request    RN can NOT refill this medication PCP messaged that patient is overdue for Labs and Office Visit. Last office visit: 6/25/2019 Skylar Arceo MD Last Physical: 4/28/2017 Last MTM visit: Visit date not found Last visit same specialty: 6/25/2019 Skylar Arceo MD.  Next visit within 3 mo: Visit date not found  Next physical within 3 mo: Visit date not found      Joselyn RICHARD Vazquez, [facility] Triage/Med Refill 2/1/2021    Requested Prescriptions   Pending Prescriptions Disp Refills     venlafaxine (EFFEXOR XR) 75 MG 24 hr capsule 90 capsule 1     Sig: Take 1 capsule (75 mg total) by mouth daily.       Venlafaxine/Desvenlafaxine Refill Protocol Failed - 2/1/2021  1:07 PM        Failed - Fasting lipid cascade in last year     Cholesterol   Date Value Ref Range Status   04/28/2017 200 (H) <=199 mg/dL Final     Triglycerides   Date Value Ref Range Status   04/28/2017 91 <=149 mg/dL Final     HDL Cholesterol   Date Value Ref Range Status   04/28/2017 43 (L) >=50 mg/dL Final     LDL Calculated   Date Value Ref Range Status   04/28/2017 139 (H) <=129 mg/dL Final     Patient Fasting > 8hrs?   Date Value Ref Range Status   11/13/2019 No  Final             Failed - PCP or prescribing provider visit in last year     Last office visit with prescriber/PCP: 6/25/2019 Skylar Arceo MD OR same dept: Visit date not found OR same specialty: 6/25/2019 Skylar Arceo MD  Last physical: 4/28/2017 Last MTM visit: Visit date not found   Next visit within 3 mo: Visit date not found  Next physical within 3 mo: Visit date not found  Prescriber OR PCP: Skylar Arceo MD  Last diagnosis associated with med order: 1. Anxiety  - venlafaxine (EFFEXOR XR) 75 MG 24 hr capsule; Take 1 capsule (75 mg total) by mouth daily.  Dispense: 90 capsule; Refill: 1    If protocol passes may refill for 12 months if within 3 months of last provider visit (or a total of 15  months).             Passed - LFT or AST or ALT in last year     Albumin   Date Value Ref Range Status   11/23/2018 3.9 3.5 - 5.0 g/dL Final     Bilirubin, Total   Date Value Ref Range Status   11/23/2018 0.4 0.0 - 1.0 mg/dL Final     Alkaline Phosphatase   Date Value Ref Range Status   11/23/2018 94 45 - 120 U/L Final     AST   Date Value Ref Range Status   02/16/2020 9 0 - 40 U/L Final     ALT   Date Value Ref Range Status   02/16/2020 <9 0 - 45 U/L Final     Protein, Total   Date Value Ref Range Status   11/23/2018 7.5 6.0 - 8.0 g/dL Final                Passed - Blood Pressure in last year     BP Readings from Last 1 Encounters:   03/18/20 130/84

## 2021-06-14 NOTE — PROGRESS NOTES
Tammy is doing very well today.  She really has no concerns.  She had her 20 week ultrasound which was normal.  She does have a uterine fibroid which is unlikely to be of any consequence in the pregnancy.    She is having a boy    Really no concerns today.  She will follow-up in 4 weeks for her 24 week appointment and her 1 hour blood sugar test.  We discussed this a bit today.

## 2021-06-14 NOTE — PROGRESS NOTES
Tammy is doing well.  No concerns today.  She had trisomy testing at [facility] which was normal.  She does have a fibroid in her uterus which is unlikely to be of any consequence in this pregnancy.  This will be reevaluated at the 20 week ultrasound.    Referral for her 20 week fetal survey given.  She is planning on finding out the gender.    Nausea is improving a bit.  She is getting occasional headaches and we discussed using Tylenol.  She is up-to-date with her flu shot.

## 2021-06-15 NOTE — PROGRESS NOTES
Assessment/Plan:    Tammy Obregon is a 32 y.o. female presenting for:    1. 25 weeks gestation of pregnancy  1 hour blood sugar test today -   She unfortunately failed her 1 hour blood sugar test and will come back for 3 hour.  Hemoglobin was 10.6.  I encouraged her to begin eating iron rich foods and we can recheck in 4-6 weeks.  - Glucose,Gestational Challenge (1 Hour)  - Hemoglobin    2. Vaginitis  Wet prep is normal today.  Treatment was successful.    - Wet Prep, Vaginal        There are no discontinued medications.        Chief Complaint:  Chief Complaint   Patient presents with     Follow-up     Vaginitis     Sxs are currently going away with using the Monistat 3 day     1 Hour GTT       Subjective:   Tammy Obregon is a pleasant 32-year-old female who is 25 weeks and 2 days pregnant presenting to the clinic today for follow-up of vaginitis.  She was diagnosed with a yeast infection about 1 week ago.  She used Monistat 3 over-the-counter and states that she is feeling better.  She never had itching but she states her vaginal discharge is a bit better.    She is doing her 1 hour glucose tolerance test today.    She is having good fetal movement.  Really no questions or concerns.    12 point review of systems completed and negative except for what has been described above    History   Smoking Status     Never Smoker   Smokeless Tobacco     Never Used       Current Outpatient Prescriptions   Medication Sig Note     CHOLECALCIFEROL, VITAMIN D3, (VITAMIN D3 ORAL) Take by mouth.      CYANOCOBALAMIN, VITAMIN B-12, (VITAMIN B-12 ORAL) Take 2,500 mcg by mouth.      doxylamine (UNISOM, DOXYLAMINE,) 25 mg tablet  10/13/2017: I cut this pill in half; taken as needed, one-half pill at night Received from: Patient     ondansetron (ZOFRAN, AS HYDROCHLORIDE,) 4 MG tablet Take 1 tablet (4 mg total) by mouth every 8 (eight) hours as needed for nausea.      PRENATAL VIT 91/IRON/FOLIC/DHA (PRENATAL + DHA ORAL)   "10/13/2017: Nature Made Prenatal Multi + DHA     pyridoxine, vitamin B6, (VITAMIN B-6) 25 MG tablet  10/13/2017: Taken as needed; I cut a 100 mg pill in quarters. Received from: Patient     venlafaxine (EFFEXOR-XR) 37.5 MG 24 hr capsule TAKE 1 CAPSULE(37.5 MG) BY MOUTH DAILY          Objective:  Vitals:    01/24/18 1549   BP: 118/72   Pulse: 72   Resp: 16   Temp: 97.3  F (36.3  C)   TempSrc: Oral   Weight: 193 lb 6 oz (87.7 kg)   Height: 5' 4.25\" (1.632 m)       Vital signs reviewed and stable  General: No acute distress  Psych: Appropriate affect  HEENT: moist mucous membranes  Extremities: warm and well perfused with no edema  Skin: warm and dry with no rash  Fetal heart tones: 145  Abdomen: Uterus measures at 25 cm       This note has been dictated and transcribed using voice recognition software.   Any errors in transcription are unintentional and inherent to the software.  "

## 2021-06-15 NOTE — PROGRESS NOTES
Tammy is doing well today.  She really has no specific concerns other than some increased vaginal discharge.  She does not notice that it is itchy or smelly.  She was hopeful to get this checked out today.    She notes to me that she was fairly ill with an upper respiratory infection and some sinus issues back a few weeks ago.  As that was healing she had a few days of central chest pain.  She does not notice any associated shortness of breath but did have some intermittent palpitations with this.  That has completely abated.  It was not related to activity.  Again she did not have any shortness of breath with it.    Her  Brenton is here with her today.    She will follow-up in 4 weeks at which point we will do her 1 hour blood sugar test.  We were supposed to do that today however our lab staff was out sick.

## 2021-06-16 NOTE — PROGRESS NOTES
Doing well.  No questions today.  She is beginning to feel a bit tired.  She did take a tour of the hospital which she found to be helpful.  She will register today.    No further concerns.  We discussed potentially having a resident in the room at the hospital and she will consider this.  We also discussed pain control and she will consider an epidural.    All questions answered today.

## 2021-06-16 NOTE — PROGRESS NOTES
She is doing very well today.  She has been noticing that she has been feeling somewhat tired.  She failed her 1 hour blood sugar test but passed her 3 hour blood sugar test.  Most recent hemoglobin was 10.6 but a repeat today was 11.0.    She has no other concerns today.  She believes that she would like to try to not have an epidural but is keeping her options open.    She believes that she will to her the hospital and I gave her number to get this set up.

## 2021-06-17 NOTE — ANESTHESIA PREPROCEDURE EVALUATION
Anesthesia Evaluation      Patient summary reviewed   No history of anesthetic complications     Airway   Mallampati: II   Pulmonary - negative ROS and normal exam                          Cardiovascular - negative ROS  Exercise tolerance: > or = 4 METS  Rhythm: regular  Rate: normal,         Neuro/Psych - negative ROS     Endo/Other - negative ROS   (+) pregnant     GI/Hepatic/Renal - negative ROS           Dental - normal exam                        Anesthesia Plan  Planned anesthetic: epidural    ASA 2     Anesthetic plan and risks discussed with: patient    Post-op plan: routine recovery

## 2021-06-17 NOTE — ANESTHESIA PROCEDURE NOTES
Epidural Block    Patient location during procedure: OB  Time Called: 4/10/2018 5:07 PM  Reason for Block:labor epidural  Staffing:  Performing  Anesthesiologist: DEN ORTIZ  Preanesthetic Checklist  Completed: patient identified, risks, benefits, and alternatives discussed, timeout performed, consent obtained, airway assessed, oxygen available, suction available, emergency drugs available and hand hygiene performed  Procedure  Patient position: sitting  Prep: ChloraPrep  Patient monitoring: continuous pulse oximetry, heart rate and blood pressure  Approach: midline  Location: L3-L4  Injection technique: SIDDHARTH saline  Number of Attempts:1  Needle  Needle type: Satnam   Needle gauge: 18 G     Catheter in Space: 4  Assessment  Sensory level: T8  No complications

## 2021-06-17 NOTE — ANESTHESIA POSTPROCEDURE EVALUATION
Patient: Tammy Obregon  * No procedures listed *  Anesthesia type: epidural    Patient location: Labor and Delivery  Last vitals:   Vitals:    04/11/18 0730   BP: 116/62   Pulse: (!) 102   Resp: 18   Temp: 36.7  C (98  F)   SpO2: 97%     Post vital signs: stable  Level of consciousness: awake and responds to simple questions  Post-anesthesia pain: pain controlled  Post-anesthesia nausea and vomiting: no  Pulmonary: unassisted, return to baseline  Cardiovascular: stable and blood pressure at baseline  Hydration: adequate  Anesthetic events: no    QCDR Measures:  ASA# 11 - Ivone-op Cardiac Arrest: ASA11B - Patient did NOT experience unanticipated cardiac arrest  ASA# 12 - Ivone-op Mortality Rate: ASA12B - Patient did NOT die  ASA# 13 - PACU Re-Intubation Rate: NA - No ETT / LMA used for case  ASA# 10 - Composite Anes Safety: ASA10A - No serious adverse event    Additional Notes:

## 2021-06-17 NOTE — PROGRESS NOTES
Doing well.  No contractions.  Discussed who to call and potential epidural.    All questions answered.

## 2021-06-18 NOTE — PROGRESS NOTES
"Assessment:        Tammy Obregon is a 32 y.o. female here for postpartum exam at 6 weeks postpartum.      1. Contraception  norethindrone (ORTHO MICRONOR) 0.35 mg tablet   . .    Plan:        1. Doing well - no concerns  2. Contraception: oral progesterone-only contraceptive  3. No Follow-up on file.      Subjective:       Tammy Obregon is a 32 y.o. female who presents for a postpartum visit. She is 6 weeks postpartum following a spontaneous vaginal delivery. I have fully reviewed the prenatal and intrapartum course. The delivery was at 36 and 2/7 gestational weeks. Outcome: spontaneous vaginal delivery. Postpartum course has been uncomplicated. Baby's course has been uncomplicated. Baby is feeding by breast. Bled for  2 weeks postpartum.  She is currently experiencing  no bleeding. Bowel function is normal. Bladder function is normal. Patient has not resumed intercourse. Contraception method is abstinence. Postpartum depression screening score: 0 - weaning off of effexor as she is breastfeeding and her son seems jittery when she is on it     The following portions of the patient's history were reviewed and updated as appropriate: allergies, current medications and problem list    Review of Systems  General:  Denies problem  Eyes: Denies problem  Ears/Nose/Throat: Denies problem  Cardiovascular: Denies problem  Respiratory:  Denies problem  Gastrointestinal:  Denies problem, Genitourinary: Denies problem  Musculoskeletal:  Denies problem  Skin: Denies problem  Neurologic: Denies problem  Psychiatric: Denies problem  Endocrine: Denies problem  Heme/Lymphatic: Denies problem   Allergic/Immunologic: Denies problem          Objective:         Vitals:    05/25/18 1413   BP: 110/74   Pulse: 68   Resp: 12   Temp: 97.4  F (36.3  C)   TempSrc: Oral   Weight: 182 lb 9 oz (82.8 kg)   Height: 5' 4\" (1.626 m)       Physical Exam:  General Appearance: Alert, cooperative, no distress, appears stated age  Head: " Normocephalic, without obvious abnormality, atraumatic  Eyes: PERRL, conjunctiva/corneas clear, EOM's intact  Throat: Lips, mucosa, and tongue normal  Neck: Supple, symmetrical, trachea midline, no adenopathy;  thyroid: not enlarged   Lungs: Clear to auscultation bilaterally, respirations unlabored  Breasts: No breast masses, tenderness, asymmetry, or nipple discharge.  Heart: Regular rate and rhythm, S1 and S2 normal   Abdomen: Soft, non-tender, uterus firm  Pelvic:Normally developed genitalia with no external lesions or eruptions. Vagina show no lesions, inflammation, discharge or tenderness. No cystocele, No rectocele. Uterus firm.  No adnexal mass or tenderness.    Extremities: Extremities normal, atraumatic, no cyanosis or edema  Skin: Skin color, texture, turgor normal, no rashes or lesions  Lymph nodes: Cervical, supraclavicular, and axillary nodes normal  Neurologic: Normal

## 2021-06-19 NOTE — PROGRESS NOTES
"[facility] Clinic Note    Patient Name: Tammy Obregon  Patient Age: 32 y.o.  YOB: 1985  MRN: [id number]    Date of visit: 8/6/2018    Patient Active Problem List   Diagnosis     Vitamin B12 Deficiency     Hyperlipidemia     Obesity     Anxiety     Fatigue     Allergic Rhinitis     Vitamin D Deficiency     Pregnant     36 weeks gestation of pregnancy     Social History     Social History Narrative     Family History   Problem Relation Age of Onset     Hyperlipidemia Father      Hearing loss Maternal Grandfather      No Medical Problems Paternal Grandmother      Hearing loss Paternal Grandfather      Breast cancer Maternal Aunt 40     Outpatient Encounter Prescriptions as of 8/6/2018   Medication Sig Dispense Refill     loratadine (CLARITIN) 10 mg tablet Take 10 mg by mouth daily.       norethindrone (ORTHO MICRONOR) 0.35 mg tablet Take 1 tablet (0.35 mg total) by mouth daily. 90 tablet 2     [DISCONTINUED] PRENATAL VIT 91/IRON/FOLIC/DHA (PRENATAL + DHA ORAL)        No facility-administered encounter medications on file as of 8/6/2018.        Chief Complaint:   Chief Complaint   Patient presents with     Sore Throat     Fever       /64  Pulse 80  Temp 97.7  F (36.5  C) (Oral)   Resp 12  Ht 5' 4\" (1.626 m)  Wt 178 lb 8 oz (81 kg)  LMP 08/04/2018  BMI 30.64 kg/m2  HPI:   Duration:1 days    Cough: no  Sore throat: Yes  Rhinitis/nasal congestion: no  Sinus pressure/pain: no  Otalgia: no  Fever: no      Able to ingest fluid: Yes      ROS: Pertinent ros findings in hpi, all other systems negative.    Objective/Physical Exam:     /64  Pulse 80  Temp 97.7  F (36.5  C) (Oral)   Resp 12  Ht 5' 4\" (1.626 m)  Wt 178 lb 8 oz (81 kg)  LMP 08/04/2018  BMI 30.64 kg/m2    Heart: Regular rhythm, no rubs or gallops.  Normal rate: y  Murmur: n    Lungs:   Clear to auscultation bilaterally: y  Wheeze: n  Rhonchi: n  Crackles: n  Area of decreased breath sounds: n  Labored breathing: n      Left " eye:  Conjunctival erythema: n  Purulent drainage: n  Proptosis:n    Right eye:  Conjunctival erythema: n  Purulent drainage: n  Proptosis: n    Right tympanic membrane:   color: pale  ossicles visualized: y  umbo distorted: n  cone of light distorted: n  Air/fluid levels: n  Drainage:n  Canal: not edematous no discharge  Pain with external ear manipulation: n  Foreign body: n    Left tympanic membrane:   color: pale  ossicles visualized: y  umbo distorted: n  cone of light distorted: n  Air/fluid levels: n  Drainage: n  Canal: not edematous no discharge  Pain with external ear manipulation: n  Foreign body: n    No auricular protrusion or erythema/swelling over mastoid area bilaterally.    No white patches that scrape off, no deviation of uvula. no ulcerations noted.    No torticollis, neck stiffness, drooling, muffled/hot potato voice, submandibular swelling, trismus or stridor.    Pharynx:   Erythema: n  Pus pockets: n  Tender cervical lymphadenopathy: y    Well appearing: y  Moist mucous membranes: y    MSK: no muscle or joint swelling  Neuro: no dysarthria or gross asymmetry  Psych: full affect, oriented x 3  Hematologic: no petechiae or purpura    Skin: No rash.       Assessment/Plan:  Recent Results (from the past 24 hour(s))   Rapid Strep A Screen-Throat   Result Value Ref Range    Rapid Strep A Antigen No Group A Strep detected, presumptive negative No Group A Strep detected, presumptive negative         ICD-10-CM    1. Sore throat J02.9 Rapid Strep A Screen-Throat     Group A Strep, RNA Direct Detection, Throat       Negative strep screen likely viral.     There are no Patient Instructions on file for this visit.    Counseled patient regarding treatments, treatment options, risks and benefits and diagnosis.  The patient was interactive, attentive, verbalized understanding, and we discussed plan.     Kole Lynch MD

## 2021-06-21 NOTE — PROGRESS NOTES
Assessment/Plan:    Tammy Obregon is a 33 y.o. female presenting for:    1. Abdominal pain, generalized  Etiology of her GI upset is unclear.  Certainly this could be due to her anxiety.  Otherwise we will have her start taking align probiotic daily for the next month.  If this is ineffective I would recommend that she begin taking Prilosec daily for 2 weeks.  She will keep me updated regarding her symptoms.  - Comprehensive Metabolic Panel  - HM2(CBC w/o Differential)    2. Anxiety  Effexor sent to the pharmacy.  Discussed the risks of most common side effects of the medication.  She has done well with this in the past.  - venlafaxine (EFFEXOR XR) 37.5 MG 24 hr capsule; Take 1 capsule (37.5 mg total) by mouth daily.  Dispense: 90 capsule; Refill: 2        There are no discontinued medications.        Chief Complaint:  Chief Complaint   Patient presents with     Abdominal Pain       Subjective:   Tammy Obregon is a pleasant 33-year-old female presenting to the clinic today for 2 concerns:    1.  GI upset: Patient notes for the last few months she has had crampy abdominal pain intermittently.  She notes that it is not really related to eating or time of day.  She states that she will get abdominal cramps which are nonlocalized.  If she has a bowel movement this seems to help.  She states that she has not been having diarrhea but does note that her bowel movements are bit looser.  On occasion when she wipes she will have a spot or 2 of blood but no blood intermixed with the stool.  No black or dark stools.  She has had this in the past and it resolved with an antacid medication per her report.  She has had some mild nausea but no vomiting.    2.  Anxiety: The patient was previously on Effexor.  She was on Effexor throughout her pregnancy but after her son was born about 6 months ago she felt as though she is doing well and got off of the medication.  She is thinking she would like to be back on the medication.  " She has not been having any panic attacks but just generally feels somewhat anxious.  She has done very well with Effexor in the past.  No suicidal or homicidal ideations.      12 point review of systems completed and negative except for what has been described above    Social History     Tobacco Use   Smoking Status Never Smoker   Smokeless Tobacco Never Used       Current Outpatient Medications   Medication Sig     norethindrone (ORTHO MICRONOR) 0.35 mg tablet Take 1 tablet (0.35 mg total) by mouth daily.     loratadine (CLARITIN) 10 mg tablet Take 10 mg by mouth daily.     venlafaxine (EFFEXOR XR) 37.5 MG 24 hr capsule Take 1 capsule (37.5 mg total) by mouth daily.         Objective:  Vitals:    11/23/18 1116   BP: 100/70   Pulse: 72   Resp: 12   Temp: 97.8  F (36.6  C)   TempSrc: Oral   Weight: 182 lb 4 oz (82.7 kg)   Height: 5' 4\" (1.626 m)       Body mass index is 31.28 kg/m .    Vital signs reviewed and stable  General: No acute distress  Psych: Appropriate affect  HEENT: moist mucous membranes, pupils equal, round, reactive to light and accomodation, posterior oropharynx clear of erythema or exudate, tympanic membranes are pearly grey bilaterally  Lymph: no cervical or supraclavicular lymphadenopathy  Cardiovascular: regular rate and rhythm with no murmur  Pulmonary: clear to auscultation bilaterally with no wheeze  Abdomen: soft, non tender, non distended with normo-active bowel sounds  Extremities: warm and well perfused with no edema  Skin: warm and dry with no rash         This note has been dictated and transcribed using voice recognition software.   Any errors in transcription are unintentional and inherent to the software.  "

## 2021-06-23 NOTE — TELEPHONE ENCOUNTER
Refill Approved    Rx renewed per Medication Renewal Policy. Medication was last renewed on 5/25/18.    Ov: 5/25/18    Caro Barnes, [facility] Triage/Med Refill 1/30/2019     Requested Prescriptions   Pending Prescriptions Disp Refills     NORLYDA 0.35 mg tablet [Pharmacy Med Name: NORLYDA 0.35MG TABLETS 28S] 84 tablet 0     Sig: TAKE 1 TABLET(0.35 MG) BY MOUTH DAILY    There is no refill protocol information for this order

## 2021-06-23 NOTE — TELEPHONE ENCOUNTER
Dr. Arceo-  See pt's mychart message and advise.  Pt would like a higher dose on the Venlafaxine.   Last OV was 11/23/2018

## 2021-07-03 NOTE — ADDENDUM NOTE
Addendum Note by Skylar Arceo MD at 2/11/2019 12:44 PM     Author: Skylar Arceo MD Service: -- Author Type: Physician    Filed: 2/11/2019 12:44 PM Encounter Date: 2/11/2019 Status: Signed    : Skylar Arceo MD (Physician)    Addended by: SKYLAR ARCEO on: 2/11/2019 12:44 PM        Modules accepted: Orders

## 2021-07-14 PROBLEM — Z34.90 PREGNANT: Status: RESOLVED | Noted: 2020-02-16 | Resolved: 2020-03-18

## 2021-07-14 PROBLEM — Z34.83 ENCOUNTER FOR SUPERVISION OF OTHER NORMAL PREGNANCY IN THIRD TRIMESTER: Status: RESOLVED | Noted: 2018-04-10 | Resolved: 2020-03-18

## 2021-08-02 ENCOUNTER — HOSPITAL ENCOUNTER (EMERGENCY)
Facility: CLINIC | Age: 36
Discharge: HOME | End: 2021-08-03
Attending: EMERGENCY MEDICINE | Admitting: EMERGENCY MEDICINE
Payer: OTHER GOVERNMENT

## 2021-08-02 ENCOUNTER — APPOINTMENT (OUTPATIENT)
Dept: CT IMAGING | Facility: CLINIC | Age: 36
End: 2021-08-02
Attending: EMERGENCY MEDICINE
Payer: OTHER GOVERNMENT

## 2021-08-02 ENCOUNTER — APPOINTMENT (OUTPATIENT)
Dept: GENERAL RADIOLOGY | Facility: CLINIC | Age: 36
End: 2021-08-02
Attending: EMERGENCY MEDICINE
Payer: OTHER GOVERNMENT

## 2021-08-02 DIAGNOSIS — R06.02 SHORTNESS OF BREATH: ICD-10-CM

## 2021-08-02 DIAGNOSIS — R00.0 SINUS TACHYCARDIA: ICD-10-CM

## 2021-08-02 LAB
ALBUMIN UR-MCNC: NEGATIVE MG/DL
ANION GAP SERPL CALCULATED.3IONS-SCNC: 11 MMOL/L (ref 3–14)
APPEARANCE UR: ABNORMAL
BASOPHILS # BLD AUTO: 0 10E3/UL (ref 0–0.2)
BASOPHILS NFR BLD AUTO: 0 %
BILIRUB UR QL STRIP: NEGATIVE
BUN SERPL-MCNC: 9 MG/DL (ref 7–30)
CALCIUM SERPL-MCNC: 8.4 MG/DL (ref 8.5–10.1)
CHLORIDE BLD-SCNC: 106 MMOL/L (ref 94–109)
CO2 SERPL-SCNC: 22 MMOL/L (ref 20–32)
COLOR UR AUTO: YELLOW
CREAT SERPL-MCNC: 0.56 MG/DL (ref 0.52–1.04)
D DIMER PPP FEU-MCNC: 0.92 UG/ML FEU (ref 0–0.5)
EOSINOPHIL # BLD AUTO: 0 10E3/UL (ref 0–0.7)
EOSINOPHIL NFR BLD AUTO: 0 %
ERYTHROCYTE [DISTWIDTH] IN BLOOD BY AUTOMATED COUNT: 12.5 % (ref 10–15)
GFR SERPL CREATININE-BSD FRML MDRD: >90 ML/MIN/1.73M2
GLUCOSE BLD-MCNC: 127 MG/DL (ref 70–99)
GLUCOSE UR STRIP-MCNC: NEGATIVE MG/DL
HCG UR QL: NEGATIVE
HCT VFR BLD AUTO: 40.3 % (ref 35–47)
HGB BLD-MCNC: 13.3 G/DL (ref 11.7–15.7)
HGB UR QL STRIP: NEGATIVE
IMM GRANULOCYTES # BLD: 0 10E3/UL
IMM GRANULOCYTES NFR BLD: 0 %
KETONES UR STRIP-MCNC: 5 MG/DL
LEUKOCYTE ESTERASE UR QL STRIP: NEGATIVE
LYMPHOCYTES # BLD AUTO: 1.2 10E3/UL (ref 0.8–5.3)
LYMPHOCYTES NFR BLD AUTO: 13 %
MCH RBC QN AUTO: 28.4 PG (ref 26.5–33)
MCHC RBC AUTO-ENTMCNC: 33 G/DL (ref 31.5–36.5)
MCV RBC AUTO: 86 FL (ref 78–100)
MONOCYTES # BLD AUTO: 0.4 10E3/UL (ref 0–1.3)
MONOCYTES NFR BLD AUTO: 4 %
MUCOUS THREADS #/AREA URNS LPF: PRESENT /LPF
NEUTROPHILS # BLD AUTO: 7.2 10E3/UL (ref 1.6–8.3)
NEUTROPHILS NFR BLD AUTO: 83 %
NITRATE UR QL: NEGATIVE
NRBC # BLD AUTO: 0 10E3/UL
NRBC BLD AUTO-RTO: 0 /100
PH UR STRIP: 6 [PH] (ref 5–7)
PLATELET # BLD AUTO: 255 10E3/UL (ref 150–450)
POTASSIUM BLD-SCNC: 3.6 MMOL/L (ref 3.4–5.3)
RBC # BLD AUTO: 4.69 10E6/UL (ref 3.8–5.2)
RBC URINE: 2 /HPF
SARS-COV-2 RNA RESP QL NAA+PROBE: NEGATIVE
SODIUM SERPL-SCNC: 139 MMOL/L (ref 133–144)
SP GR UR STRIP: 1.03 (ref 1–1.03)
SQUAMOUS EPITHELIAL: 9 /HPF
UROBILINOGEN UR STRIP-MCNC: NORMAL MG/DL
WBC # BLD AUTO: 8.7 10E3/UL (ref 4–11)
WBC URINE: 1 /HPF

## 2021-08-02 PROCEDURE — 99285 EMERGENCY DEPT VISIT HI MDM: CPT | Mod: 25 | Performed by: EMERGENCY MEDICINE

## 2021-08-02 PROCEDURE — 258N000003 HC RX IP 258 OP 636: Performed by: EMERGENCY MEDICINE

## 2021-08-02 PROCEDURE — 93308 TTE F-UP OR LMTD: CPT | Mod: 26 | Performed by: EMERGENCY MEDICINE

## 2021-08-02 PROCEDURE — 96361 HYDRATE IV INFUSION ADD-ON: CPT | Performed by: EMERGENCY MEDICINE

## 2021-08-02 PROCEDURE — 80048 BASIC METABOLIC PNL TOTAL CA: CPT | Performed by: EMERGENCY MEDICINE

## 2021-08-02 PROCEDURE — 85025 COMPLETE CBC W/AUTO DIFF WBC: CPT | Performed by: EMERGENCY MEDICINE

## 2021-08-02 PROCEDURE — 85379 FIBRIN DEGRADATION QUANT: CPT | Performed by: EMERGENCY MEDICINE

## 2021-08-02 PROCEDURE — 96360 HYDRATION IV INFUSION INIT: CPT | Mod: 59 | Performed by: EMERGENCY MEDICINE

## 2021-08-02 PROCEDURE — 81001 URINALYSIS AUTO W/SCOPE: CPT | Performed by: EMERGENCY MEDICINE

## 2021-08-02 PROCEDURE — 71045 X-RAY EXAM CHEST 1 VIEW: CPT

## 2021-08-02 PROCEDURE — 93005 ELECTROCARDIOGRAM TRACING: CPT | Performed by: EMERGENCY MEDICINE

## 2021-08-02 PROCEDURE — 250N000009 HC RX 250: Performed by: EMERGENCY MEDICINE

## 2021-08-02 PROCEDURE — 250N000011 HC RX IP 250 OP 636: Performed by: EMERGENCY MEDICINE

## 2021-08-02 PROCEDURE — 93308 TTE F-UP OR LMTD: CPT | Performed by: EMERGENCY MEDICINE

## 2021-08-02 PROCEDURE — 93010 ELECTROCARDIOGRAM REPORT: CPT | Performed by: EMERGENCY MEDICINE

## 2021-08-02 PROCEDURE — 36415 COLL VENOUS BLD VENIPUNCTURE: CPT | Performed by: EMERGENCY MEDICINE

## 2021-08-02 PROCEDURE — 81025 URINE PREGNANCY TEST: CPT | Performed by: EMERGENCY MEDICINE

## 2021-08-02 PROCEDURE — 250N000013 HC RX MED GY IP 250 OP 250 PS 637: Performed by: EMERGENCY MEDICINE

## 2021-08-02 PROCEDURE — 87635 SARS-COV-2 COVID-19 AMP PRB: CPT | Performed by: EMERGENCY MEDICINE

## 2021-08-02 PROCEDURE — 71275 CT ANGIOGRAPHY CHEST: CPT

## 2021-08-02 PROCEDURE — C9803 HOPD COVID-19 SPEC COLLECT: HCPCS | Performed by: EMERGENCY MEDICINE

## 2021-08-02 RX ORDER — ACETAMINOPHEN 500 MG
1000 TABLET ORAL ONCE
Status: COMPLETED | OUTPATIENT
Start: 2021-08-02 | End: 2021-08-02

## 2021-08-02 RX ORDER — IOPAMIDOL 755 MG/ML
88 INJECTION, SOLUTION INTRAVASCULAR ONCE
Status: COMPLETED | OUTPATIENT
Start: 2021-08-02 | End: 2021-08-02

## 2021-08-02 RX ADMIN — SODIUM CHLORIDE 100 ML: 9 INJECTION, SOLUTION INTRAVENOUS at 23:01

## 2021-08-02 RX ADMIN — ACETAMINOPHEN 1000 MG: 500 TABLET, FILM COATED ORAL at 21:33

## 2021-08-02 RX ADMIN — IOPAMIDOL 88 ML: 755 INJECTION, SOLUTION INTRAVENOUS at 23:00

## 2021-08-02 RX ADMIN — SODIUM CHLORIDE, POTASSIUM CHLORIDE, SODIUM LACTATE AND CALCIUM CHLORIDE 1000 ML: 600; 310; 30; 20 INJECTION, SOLUTION INTRAVENOUS at 21:33

## 2021-08-02 NOTE — ED TRIAGE NOTES
Pt didn't feel well after coming home from work, highest BP was 142 /102  p 143. PT feels slightly SOA and dizzy.  in triage, no chest pain.

## 2021-08-03 ENCOUNTER — OFFICE VISIT (OUTPATIENT)
Dept: FAMILY MEDICINE | Facility: CLINIC | Age: 36
End: 2021-08-03
Payer: OTHER GOVERNMENT

## 2021-08-03 VITALS
TEMPERATURE: 99.5 F | DIASTOLIC BLOOD PRESSURE: 90 MMHG | WEIGHT: 214 LBS | OXYGEN SATURATION: 97 % | SYSTOLIC BLOOD PRESSURE: 143 MMHG | HEART RATE: 101 BPM | BODY MASS INDEX: 36.73 KG/M2 | RESPIRATION RATE: 14 BRPM

## 2021-08-03 VITALS
DIASTOLIC BLOOD PRESSURE: 82 MMHG | TEMPERATURE: 97.4 F | SYSTOLIC BLOOD PRESSURE: 134 MMHG | BODY MASS INDEX: 37.42 KG/M2 | WEIGHT: 218 LBS | OXYGEN SATURATION: 98 % | HEART RATE: 103 BPM

## 2021-08-03 DIAGNOSIS — R53.83 FATIGUE, UNSPECIFIED TYPE: ICD-10-CM

## 2021-08-03 DIAGNOSIS — R06.02 SOB (SHORTNESS OF BREATH): ICD-10-CM

## 2021-08-03 DIAGNOSIS — R00.0 TACHYCARDIA: Primary | ICD-10-CM

## 2021-08-03 DIAGNOSIS — R25.3 EYE MUSCLE TWITCHES: ICD-10-CM

## 2021-08-03 LAB
ALBUMIN SERPL-MCNC: 3.4 G/DL (ref 3.4–5)
ALP SERPL-CCNC: 99 U/L (ref 40–150)
ALT SERPL W P-5'-P-CCNC: 18 U/L (ref 0–50)
AST SERPL W P-5'-P-CCNC: 17 U/L (ref 0–45)
BASOPHILS # BLD AUTO: 0 10E3/UL (ref 0–0.2)
BASOPHILS NFR BLD AUTO: 0 %
BILIRUB DIRECT SERPL-MCNC: <0.1 MG/DL (ref 0–0.2)
BILIRUB SERPL-MCNC: 0.2 MG/DL (ref 0.2–1.3)
EOSINOPHIL # BLD AUTO: 0 10E3/UL (ref 0–0.7)
EOSINOPHIL NFR BLD AUTO: 0 %
ERYTHROCYTE [DISTWIDTH] IN BLOOD BY AUTOMATED COUNT: 13.1 % (ref 10–15)
FERRITIN SERPL-MCNC: 78 NG/ML (ref 12–150)
HCT VFR BLD AUTO: 38.4 % (ref 35–47)
HGB BLD-MCNC: 12.4 G/DL (ref 11.7–15.7)
IRON SATN MFR SERPL: 10 % (ref 15–46)
IRON SERPL-MCNC: 38 UG/DL (ref 35–180)
LYMPHOCYTES # BLD AUTO: 1.3 10E3/UL (ref 0.8–5.3)
LYMPHOCYTES NFR BLD AUTO: 25 %
MCH RBC QN AUTO: 28.4 PG (ref 26.5–33)
MCHC RBC AUTO-ENTMCNC: 32.3 G/DL (ref 31.5–36.5)
MCV RBC AUTO: 88 FL (ref 78–100)
MONOCYTES # BLD AUTO: 0.4 10E3/UL (ref 0–1.3)
MONOCYTES NFR BLD AUTO: 8 %
NEUTROPHILS # BLD AUTO: 3.4 10E3/UL (ref 1.6–8.3)
NEUTROPHILS NFR BLD AUTO: 67 %
PLATELET # BLD AUTO: 248 10E3/UL (ref 150–450)
PROT SERPL-MCNC: 7.4 G/DL (ref 6.8–8.8)
RBC # BLD AUTO: 4.37 10E6/UL (ref 3.8–5.2)
TIBC SERPL-MCNC: 388 UG/DL (ref 240–430)
TSH SERPL DL<=0.005 MIU/L-ACNC: 2.16 MU/L (ref 0.4–4)
VIT B12 SERPL-MCNC: 318 PG/ML (ref 193–986)
WBC # BLD AUTO: 5 10E3/UL (ref 4–11)

## 2021-08-03 PROCEDURE — 82306 VITAMIN D 25 HYDROXY: CPT | Mod: 90 | Performed by: PHYSICIAN ASSISTANT

## 2021-08-03 PROCEDURE — 99214 OFFICE O/P EST MOD 30 MIN: CPT | Performed by: PHYSICIAN ASSISTANT

## 2021-08-03 PROCEDURE — 86618 LYME DISEASE ANTIBODY: CPT | Performed by: PHYSICIAN ASSISTANT

## 2021-08-03 PROCEDURE — 82607 VITAMIN B-12: CPT | Performed by: PHYSICIAN ASSISTANT

## 2021-08-03 PROCEDURE — 99000 SPECIMEN HANDLING OFFICE-LAB: CPT | Performed by: PHYSICIAN ASSISTANT

## 2021-08-03 PROCEDURE — 80076 HEPATIC FUNCTION PANEL: CPT | Performed by: PHYSICIAN ASSISTANT

## 2021-08-03 PROCEDURE — 86769 SARS-COV-2 COVID-19 ANTIBODY: CPT | Mod: 90 | Performed by: PHYSICIAN ASSISTANT

## 2021-08-03 PROCEDURE — 82728 ASSAY OF FERRITIN: CPT | Performed by: PHYSICIAN ASSISTANT

## 2021-08-03 PROCEDURE — 85025 COMPLETE CBC W/AUTO DIFF WBC: CPT | Performed by: PHYSICIAN ASSISTANT

## 2021-08-03 PROCEDURE — 36415 COLL VENOUS BLD VENIPUNCTURE: CPT | Performed by: PHYSICIAN ASSISTANT

## 2021-08-03 PROCEDURE — 83550 IRON BINDING TEST: CPT | Performed by: PHYSICIAN ASSISTANT

## 2021-08-03 PROCEDURE — 84443 ASSAY THYROID STIM HORMONE: CPT | Performed by: PHYSICIAN ASSISTANT

## 2021-08-03 ASSESSMENT — PAIN SCALES - GENERAL: PAINLEVEL: NO PAIN (0)

## 2021-08-03 NOTE — ED NOTES
Patient states left work today because she wasn't feeling well, when she got home took her vital with everything being slightly elevated, took a nap and then retook vitals and they just continued to climb. Patient denies chest pain but slightly SOB, dizzy, and a  headache. EKG done in triage.

## 2021-08-03 NOTE — ED PROVIDER NOTES
History     Chief Complaint   Patient presents with     Hypertension     HPI  Tamym Obregon is a 35 year old female who is unvaccinated for COVID-19 who presents for shortness of breath, headache, lightheadedness, and fast heart rate.  The patient says that she felt unwell at work today, left work early.  She checked her heart rate and blood pressure at home and found them both to be elevated, took a nap, and when she woke up they were still elevated.  She continues to feel unwell.  She has not take anything for her symptoms.  No fevers.  She denies cough, hemoptysis, chest pain, or pleuritic pain.  No sore throat, runny nose, or ear pain.  No rash.  She denies abdominal pain, nausea, vomiting, or diarrhea.  No dysuria, urinary frequency, or vaginal bleeding.  Last menstrual period was about 2 weeks ago and reportedly normal.  No recent travel or antibiotics.    Allergies:  No Known Allergies    Problem List:    Patient Active Problem List    Diagnosis Date Noted     Vitamin B12 Deficiency      Priority: Medium     Created by Conversion         Hyperlipidemia      Priority: Medium     Created by Conversion         Obesity      Priority: Medium     Created by Conversion         Anxiety      Priority: Medium     Created by Conversion  Replacement Utility updated for latest IMO load         Allergic Rhinitis      Priority: Medium     Created by Conversion  Replacement Utility updated for latest IMO load            Past Medical History:    Past Medical History:   Diagnosis Date     Migraine        Past Surgical History:    Past Surgical History:   Procedure Laterality Date      SECTION N/A 2020    Procedure:  SECTION;  Surgeon: So Cross MD;  Location: [facility] OR;  Service: Obstetrics     OTHER SURGICAL HISTORY      dental surgery       Family History:    Family History   Problem Relation Age of Onset     Hyperlipidemia Father      Hearing Loss Maternal Grandfather      No  Known Problems Paternal Grandmother      Hearing Loss Paternal Grandfather      Breast Cancer Maternal Aunt 40.00       Social History:  Marital Status:   [2]  Social History     Tobacco Use     Smoking status: Never Smoker     Smokeless tobacco: Never Used   Substance Use Topics     Alcohol use: Not on file     Drug use: Not on file        Medications:    cetirizine (ZYRTEC) 10 MG tablet  levonorgestrel-ethinyl estradiol (AVIANE) 0.1-20 MG-MCG tablet  Multiple Vitamins-Minerals (WOMENS MULTI VITAMIN & MINERAL) TABS  venlafaxine (EFFEXOR-ER) 150 MG 24 hr tablet  venlafaxine (EFFEXOR-XR) 150 MG 24 hr capsule  venlafaxine (EFFEXOR-XR) 75 MG 24 hr capsule          Review of Systems  Pertinent positives and negatives listed in the HPI, all other systems reviewed and are negative.    Physical Exam   BP: (!) 136/90  Pulse: (!) 134  Temp: 98.5  F (36.9  C)  Resp: 20  Weight: 97.1 kg (214 lb)  SpO2: 98 %      Physical Exam  Vitals and nursing note reviewed.   Constitutional:       General: She is in acute distress.      Appearance: She is well-developed. She is diaphoretic.   HENT:      Head: Normocephalic and atraumatic.      Right Ear: Tympanic membrane, ear canal and external ear normal.      Left Ear: Tympanic membrane, ear canal and external ear normal.      Nose: Nose normal.      Mouth/Throat:      Pharynx: No oropharyngeal exudate or uvula swelling.      Tonsils: No tonsillar exudate or tonsillar abscesses.   Eyes:      General: No scleral icterus.     Conjunctiva/sclera: Conjunctivae normal.   Cardiovascular:      Rate and Rhythm: Regular rhythm. Tachycardia present.      Heart sounds: No murmur heard.     Pulmonary:      Effort: Pulmonary effort is normal. No respiratory distress.      Breath sounds: No stridor.   Abdominal:      General: There is no distension.      Palpations: Abdomen is soft.      Tenderness: There is no abdominal tenderness. There is no guarding or rebound.   Musculoskeletal:       Cervical back: Normal range of motion. No tenderness.      Right lower leg: No edema.      Left lower leg: No edema.   Lymphadenopathy:      Cervical: No cervical adenopathy.   Skin:     General: Skin is warm.   Neurological:      Mental Status: She is alert and oriented to person, place, and time.   Psychiatric:         Behavior: Behavior normal.         ED Course        Procedures    Results for orders placed during the hospital encounter of 08/02/21    POC US ECHO LIMITED    [facility] Procedure Note    Limited Bedside ED Cardiac Ultrasound:    PROCEDURE: PERFORMED BY: Dr. Patricio Multani MD  INDICATIONS/SYMPTOM:  Shortness of Breath  PROBE: Cardiac phased array probe  BODY LOCATION: Chest  FINDINGS:  The ultrasound was performed utilizing the parasternal long axis, parasternal short axis and apical 4 chamber views.  Cardiac contractility:  Present  Gross estimation of cardiac kinesis: normal  Pericardial Effusion:  None  RV:LV ratio: LV > RV  INTERPRETATION:    Chamber size and motion were grossly normal with LV > RV, normal cardiac kinesis.  No pericardial effusion was found.  IMAGE DOCUMENTATION: Images were archived to PACs system.            EKG Interpretation:      Interpreted by Patricio Multani MD  Time reviewed: 21110  Symptoms at time of EKG: Tachycardia, dyspnea   Rhythm: sinus tachycardia  Rate: 120  Axis: normal  Ectopy: none  Conduction: normal  ST Segments/ T Waves: No ST-T wave changes  Q Waves: none  Comparison to prior: No old EKG available    Clinical Impression: Sinus tachycardia    Critical Care time:  none               Results for orders placed or performed during the hospital encounter of 08/02/21 (from the past 24 hour(s))   D dimer quantitative   Result Value Ref Range    D-Dimer Quantitative 0.92 (H) 0.00 - 0.50 ug/mL FEU    Narrative    This D-dimer assay is intended for use in conjunction with a clinical pretest probability assessment model to exclude  pulmonary embolism (PE) and deep venous thrombosis (DVT) in outpatients suspected of PE or DVT. The cut-off value is 0.50 ug/mL FEU.   CBC with Platelets & Differential    Narrative    The following orders were created for panel order CBC with Platelets & Differential.  Procedure                               Abnormality         Status                     ---------                               -----------         ------                     CBC with platelets and d...[[id number]]                      Final result                 Please view results for these tests on the individual orders.   Basic metabolic panel   Result Value Ref Range    Sodium 139 133 - 144 mmol/L    Potassium 3.6 3.4 - 5.3 mmol/L    Chloride 106 94 - 109 mmol/L    Carbon Dioxide (CO2) 22 20 - 32 mmol/L    Anion Gap 11 3 - 14 mmol/L    Urea Nitrogen 9 7 - 30 mg/dL    Creatinine 0.56 0.52 - 1.04 mg/dL    Calcium 8.4 (L) 8.5 - 10.1 mg/dL    Glucose 127 (H) 70 - 99 mg/dL    GFR Estimate >90 >60 mL/min/1.73m2   CBC with platelets and differential   Result Value Ref Range    WBC Count 8.7 4.0 - 11.0 10e3/uL    RBC Count 4.69 3.80 - 5.20 10e6/uL    Hemoglobin 13.3 11.7 - 15.7 g/dL    Hematocrit 40.3 35.0 - 47.0 %    MCV 86 78 - 100 fL    MCH 28.4 26.5 - 33.0 pg    MCHC 33.0 31.5 - 36.5 g/dL    RDW 12.5 10.0 - 15.0 %    Platelet Count 255 150 - 450 10e3/uL    % Neutrophils 83 %    % Lymphocytes 13 %    % Monocytes 4 %    % Eosinophils 0 %    % Basophils 0 %    % Immature Granulocytes 0 %    NRBCs per 100 WBC 0 <1 /100    Absolute Neutrophils 7.2 1.6 - 8.3 10e3/uL    Absolute Lymphocytes 1.2 0.8 - 5.3 10e3/uL    Absolute Monocytes 0.4 0.0 - 1.3 10e3/uL    Absolute Eosinophils 0.0 0.0 - 0.7 10e3/uL    Absolute Basophils 0.0 0.0 - 0.2 10e3/uL    Absolute Immature Granulocytes 0.0 <=0.0 10e3/uL    Absolute NRBCs 0.0 10e3/uL   Symptomatic COVID-19 Virus (Coronavirus) by PCR Nasopharyngeal    Specimen: Nasopharyngeal; Swab    Narrative    The following orders  were created for panel order Symptomatic COVID-19 Virus (Coronavirus) by PCR Nasopharyngeal.  Procedure                               Abnormality         Status                     ---------                               -----------         ------                     SARS-COV2 (COVID-19) Vir...[[id number]]  Normal              Final result                 Please view results for these tests on the individual orders.   SARS-COV2 (COVID-19) Virus RT-PCR    Specimen: Nasopharyngeal; Swab   Result Value Ref Range    SARS CoV2 PCR Negative Negative    Narrative    Testing was performed using the kaveh  SARS-CoV-2 & Influenza A/B Assay on the kaveh  [employer]  System.  This test should be ordered for the detection of SARS-COV-2 in individuals who meet SARS-CoV-2 clinical and/or epidemiological criteria. Test performance is unknown in asymptomatic patients.  This test is for in vitro diagnostic use under the FDA EUA for laboratories certified under CLIA to perform moderate and/or high complexity testing. This test has not been FDA cleared or approved.  A negative test does not rule out the presence of PCR inhibitors in the specimen or target RNA in concentration below the limit of detection for the assay. The possibility of a false negative should be considered if the patient's recent exposure or clinical presentation suggests COVID-19.  [facility] are certified under the Clinical Laboratory Improvement Amendments of 1988 (CLIA-88) as qualified to perform moderate and/or high complexity laboratory testing.   HCG qualitative urine   Result Value Ref Range    hCG Urine Qualitative Negative Negative   UA with Microscopic   Result Value Ref Range    Color Urine Yellow Colorless, Straw, Light Yellow, Yellow    Appearance Urine Slightly Cloudy (A) Clear    Glucose Urine Negative Negative mg/dL    Bilirubin Urine Negative Negative    Ketones Urine 5  (A) Negative mg/dL    Specific Gravity Urine 1.028 1.003 - 1.035     Blood Urine Negative Negative    pH Urine 6.0 5.0 - 7.0    Protein Albumin Urine Negative Negative mg/dL    Urobilinogen Urine Normal Normal, 2.0 mg/dL    Nitrite Urine Negative Negative    Leukocyte Esterase Urine Negative Negative    Mucus Urine Present (A) None Seen /LPF    RBC Urine 2 <=2 /HPF    WBC Urine 1 <=5 /HPF    Squamous Epithelials Urine 9 (H) <=1 /HPF   XR Chest Port 1 View    Narrative    EXAM: XR CHEST PORT 1 VIEW  LOCATION: [facility]  DATE/TIME: 8/2/2021 9:46 PM    INDICATION: dyspnea, tachycardia  COMPARISON: None.      Impression    IMPRESSION: Negative chest.   CT Chest Pulmonary Embolism w Contrast    Narrative    EXAM: CT CHEST PULMONARY EMBOLISM W CONTRAST  LOCATION: [facility]  DATE/TIME: 8/2/2021 11:01 PM    INDICATION: PE suspected, low/intermediate prob, positive D-dimer, shortness of breath, tachycardia, elevated blood pressure  COMPARISON: None.  TECHNIQUE: CT chest pulmonary angiogram during arterial phase injection of IV contrast. Multiplanar reformats and MIP reconstructions were performed. Dose reduction techniques were used.   CONTRAST: 88 ml Isovue 370    FINDINGS:  ANGIOGRAM CHEST: Pulmonary arteries are normal caliber and negative for pulmonary emboli. Thoracic aorta is negative for dissection. No CT evidence of right heart strain.    LUNGS AND PLEURA: Very mild airway thickening. No focal infiltrate or effusion.    MEDIASTINUM/AXILLAE: Normal.    CORONARY ARTERY CALCIFICATION: None.    UPPER ABDOMEN: Dense hepatic steatosis.    MUSCULOSKELETAL: Normal.      Impression    IMPRESSION:  1.  Dense hepatic steatosis.  2.  Very mild airway thickening likely infectious or inflammatory. No focal infiltrate or effusion.  3.  No pulmonary embolus, aortic aneurysm or dissection.   POC US ECHO LIMITED    Impression    [facility] Procedure Note      Limited Bedside ED Cardiac Ultrasound:    PROCEDURE: PERFORMED BY:   Patricio Multani MD  INDICATIONS/SYMPTOM:  Shortness of Breath  PROBE: Cardiac phased array probe  BODY LOCATION: Chest  FINDINGS:   The ultrasound was performed utilizing the parasternal long axis, parasternal short axis and apical 4 chamber views.  Cardiac contractility:  Present  Gross estimation of cardiac kinesis: normal  Pericardial Effusion:  None  RV:LV ratio: LV > RV  INTERPRETATION:    Chamber size and motion were grossly normal with LV > RV, normal cardiac kinesis.  No pericardial effusion was found.   IMAGE DOCUMENTATION: Images were archived to PACs system.            Medications   lactated ringers BOLUS 1,000 mL (0 mLs Intravenous Stopped 8/2/21 2306)   acetaminophen (TYLENOL) tablet 1,000 mg (1,000 mg Oral Given 8/2/21 2133)   iopamidol (ISOVUE-370) solution 88 mL (88 mLs Intravenous Given 8/2/21 2300)   sodium chloride 0.9 % bag 500mL for CT scan flush use (100 mLs As instructed Given 8/2/21 2301)       Assessments & Plan (with Medical Decision Making)   35-year-old female who presents for headache, shortness of breath, lightheadedness, and tachycardia.  Blood pressure is 131/108, temperature is 37.5  C, heart rate is 132, SPO2 is 97% on room air.  EKG shows sinus tachycardia without signs of ischemia.  She is given IV fluids. Chest x-ray and CT of the chest obtained, images reviewed independently as well as radiology read reviewed, no signs of infiltrate to suggest pneumonia, no signs of pneumothorax, no signs of pulmonary embolism. There is some mild prominence of her airway suggestive of viral illness. Bedside ultrasound shows good cardiac function without pericardial effusion. Urinalysis is negative for signs of blood or infection and urine pregnancy test is negative. Covid swab is negative. White blood cell count is 8.7 which is reassuring and hemoglobin is 13.3, no signs of anemia causing symptoms. Unclear cause of patient's symptoms at this time. She is breathing comfortably, she has  maintained sinus tachycardia here, improved with the fluids but still around 100. She is well-appearing and has had a reassuring work-up so far and at this point is safe to discharge with instructions to drink plenty fluids, return if worse, otherwise follow-up if not better in the next several days. The patient is in agreement with this plan.    I have reviewed the nursing notes.    I have reviewed the findings, diagnosis, plan and need for follow up with the patient.       Discharge Medication List as of 8/3/2021 12:08 AM          Final diagnoses:   Shortness of breath   Sinus tachycardia       8/2/2021   [facility] EMERGENCY DEPT     Patricio Multani MD  08/03/21 0706

## 2021-08-03 NOTE — PROGRESS NOTES
Assessment & Plan         ICD-10-CM    1. Tachycardia  R00.0 CBC with platelets and differential     Ferritin     Hepatic panel (Albumin, ALT, AST, Bili, Alk Phos, TP)     Iron and iron binding capacity     TSH with free T4 reflex     Vitamin B12     Vitamin D Deficiency     Lyme Disease [place]af[place] with reflex to WB Serum     CBC with platelets and differential     Ferritin     Hepatic panel (Albumin, ALT, AST, Bili, Alk Phos, TP)     Iron and iron binding capacity     TSH with free T4 reflex     Vitamin B12     Vitamin D Deficiency     Lyme Disease [place] with reflex to WB Serum     COVID-19 Jhonathan RBD Dafne & Titer Reflex   2. Fatigue, unspecified type  R53.83    3. Eye muscle twitches  R25.3    4. SOB (shortness of breath)  R06.02      Very pleasant woman in NAD on exam today  We reviewed her ED visit from yesterday and the labs/imaging/testing she had done which although does not explain the cause of her symptoms is reassuring in other ways  We discussed obtaining a few additional labs today - specifically I would want to look at iron studies and thyroid, given history will check B12 as well and add a lyme in  Will review results when everything is back and determine if any additional testing is needed    30 minutes spent on the date of the encounter doing chart review, review of test results, interpretation of tests, patient visit and documentation       Return in about 4 days (around 8/7/2021) for review lab results.    Jennifer Mortensen PA-C  [facility] CLARISSA Moreno is a 35 year old who presents for the following health issues     HPI     ED/ Followup:    Facility:  [facility] Emergency Department   Date of visit: 8/2/2021  Reason for visit: Shortness of breath, high blood pressure and high pulse   Current Status: She is still feeling off.        -She has noticed over the last couple of months she has been having headaches more than usual.     -She has been feeling very  "2019       RE: Magdalena Gordon  [id number] [address] [id number]     Dear Colleague,    Thank you for referring your patient, Magdalena Gordon, to the [facility] MEDICAL WEIGHT MANAGEMENT at [facility]. Please see a copy of my visit note below.      New Medical Weight Management Consult    PATIENT:  Magdalena Gordon  MRN:         [id number]  :         1961  REMEDIOS:         2019    Dear Alee Graves,    I had the pleasure of seeing your patient, Magdalena Gordon.  Full intake/assessment done to determine barriers to weight loss success and develop a treatment plan.  Magdalena Gordon is a 58 year old female interested in treatment of medical problems associated with weight.  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  If you do not know, please answer: \"I Don't Know\". dr pelaez       Wt Readings from Last 4 Encounters:   19 108.2 kg (238 lb 8 oz)   10/30/19 106.7 kg (235 lb 3.2 oz)   10/31/18 102.5 kg (225 lb 15.5 oz)   17 99.3 kg (219 lb)     She is here because she is concerned that she could not lose weight. She stated that she has gained 10 lbs over the past years for " "fatigued lately.     -Her left eye has been twitching a lot as well as her right cheek. She said she has been low on vitamin b12 before.     Has been feeling \"not well\" for the past few months - gradual onset but getting worse to the point that she ended up in the ED yesterday  Heart rate has been high  Feeling very fatigued  Having eye muscle twitches that are becoming annoying  More headaches than normal - does get headaches but frequency is increasing  Feeling SOB at times  Stomach aches on occasion    In ED yesterday - blood pressured noted to be elevated and she was tachycardic.   EKG unremarkable  CXR and CT of chest (d dimer positive) -normal/negative  Bedside ultrasound without pericardial effusion and normal cardiac function  COVID swab negative  CBC normal  Given fluids in the ED and given she was well appearing discharged to follow up with PCP    No significant PMHx  On venlafaxine but not a new medication    Periods have been regular  Stomach issues chronic- has had intermittent issues with abdominal pain much of her life.   No formal dx of anything - no further testing has been done    Does note she was on B12 replacement 'years ago\" as she was found to be deficient   Presenting symptom at that time was numbness in her fingers  No reason she is aware of for the deficiency    Not sure if all her symptoms are just due to being tired because she has 2 young kids (ages 1 and 3) or something else underlying          Review of Systems   Remainder of ROS obtained and found to be negative other than that which was documented above        Objective    /82   Pulse 103   Temp 97.4  F (36.3  C) (Tympanic)   Wt 98.9 kg (218 lb)   SpO2 98%   BMI 37.42 kg/m    Body mass index is 37.42 kg/m .  Physical Exam   GENERAL: healthy, alert and no distress  EYES: Eyes grossly normal to inspection  RESP: lungs clear to auscultation - no rales, rhonchi or wheezes  CV: regular rates and rhythm, normal S1 S2, no S3 or " which she attributed to increased stress and emotional eating. She said that her work is very stressful and she traveled a lot for work. She also has sweet craving particularly chocolate, candy or cup cake. She did Weight Watcher in the past and could not continue due to logistic. She did Whole 30 in Jan 2019 but lost only a few pounds. She has been eating at the restaurant often due to frequent traveling. She is not interested in taking medication for diabetes or weight loss at this time.     She quit smoking 10 years ago.  She works long hours from 8 am to 8 pm. She goes to bed around 8 pm-11 pm.     Weight History Reviewed With Patient 11/19/2019   How concerned are you about your weight? Somewhat Concerned   Would you describe your weight gain as gradual? Yes   I became overweight: As an Adult   The following factors have contributed to my weight gain:  A Health Crisis/Stress, After Quitting Smoking, Eating Wrong Types of Food, Lack of Exercise   I have tried the following methods to lose weight: Watching Portions or Calories, Exercise, Weight Watchers   I have the following family history of obesity/being overweight:  One or more of my siblings are overweight, Many of my relatives are overweight   Has anyone in your family had weight loss surgery? No       Diet Recall Reviewed With Patient 11/19/2019   How many glasses of juice do you drink in a typical day? 0   How many of glasses of milk do you drink in a typical day? 0   How many 8oz glasses of sugar containing drinks such as Natan-Aid/sweet tea do you drink in a day? 0   How many cans/bottles of sugar pop/soda/tea/sports drinks do you drink in a day? 0   How many cans/bottles of diet pop/soda/tea or sports drink do you drink in a day? 0   How often do you have a drink of alcohol? Monthly or Less   If you do drink, how many drinks might you have in a day? 1 or 2       Eating Habits Reviewed With Patient 11/19/2019   Generally, my meals include foods like  S4, no murmur, click or rub and no peripheral edema  ABDOMEN: soft, nontender and bowel sounds normal  MS: no gross musculoskeletal defects noted, no edema  SKIN: no suspicious lesions or rashes    Diagnostic Tests:  none               these: bread, pasta, rice, potatoes, corn, crackers, sweet dessert, pop, or juice. A Few Times a Week   Generally, my meals include foods like these: fried meats, brats, burgers, french fries, pizza, cheese, chips, or ice cream. A Few Times a Week   Eat fast food (like [employer], [employer], [employer]). Never   Eat at a buffet or sit-down restaurant. A Few Times a Week   Eat most of my meals in front of the TV or computer. Half of the Week   Often skip meals, eat at random times, have no regular eating times. A Few Times a Week   Rarely sit down for a meal but snack or graze throughout.  A Few Times a Week   Eat extra snacks between meals. A Few Times a Week   Eat most of my food at the end of the day. A Few Times a Week   Eat in the middle of the night or wake up at night to eat. Never   Eat extra snacks to prevent or correct low blood sugar. Never   Eat to prevent acid reflux or stomach pain. Never   Worry about not having enough food to eat. Never   Have you been to the food shelf at least a few times this year? No   I eat when I am depressed, stressed, anxious, or bored. A Few Times a Week   I eat when I am happy or as a reward. A Few Times a Week   I feel hungry all the time even if I just have eaten. Never   Feeling full is important to me. Never   Once I start eating, it is hard to stop. Never   I finish all the food on my plate even if I am already full. Never   I can't resist eating delicious food or walk past the good food/smell. Half of the Week   I eat/snack without noticing that I am eating. Never   I eat when I am preparing the meal. Never   I eat more than usual when I see others eating. Never   I have trouble not eating sweets, ice cream, cookies, or chips if they are around the house. A Few Times a Week   I think about food all day. A Few Times a Week   What foods, if any, do you crave? Sweets/Candy/Chocolate   I feel out of control when eating. Never   I eat a large amount of food, like a loaf of  bread, a box of cookies, a pint/quart of ice cream, all at once. Never   I eat a large amount of food even when I am not hungry. Never   I eat rapidly. Weekly   I eat alone because I feel embarrassed and do not want others to see how much I have eaten. Never   I eat until I am uncomfortably full. Never   I feel bad, disgusted, or guilty after I overeat. Never   I make myself vomit what I have eaten or use laxatives to get rid of food. Never       Activity/Exercise History Reviewed With Patient 11/19/2019   How much of a typical 12 hour day do you spend sitting? Most of the Day   How much of a typical day do you spend walking/standing? Less Than Half the Day   How many hours (not including work) do you spend on the TV/Video Games/Computer/Tablet/Phone? 1 Hour or Less   How many times a week are you active for the purpose of exercise? 2-3 Times a Week   How many total minutes do you spend doing some activity for the purpose of exercising when you exercise? More Than 30 Minutes   What keeps you from being more active? Pain, Shortness of Breath       PAST MEDICAL HISTORY:  Past Medical History:   Diagnosis Date     Circulation problem      Headache, migraine      Hyperlipemia      Thyroid disease      Uncomplicated asthma        Work/Social History Reviewed With Patient 11/19/2019   My employment status is: Full-Time   My job is:    How much of your job is spent on the computer or phone? 75%   What is your marital status? /In a Relationship   If in a relationship, is your significant other overweight? No   Do you have children? Yes   If you have children, are they overweight? No       Mental Health History Reviewed With Patient 11/19/2019   Have you ever been physically or sexually abused? No   How often in the past 2 weeks have you felt little interest or pleasure in doing things? For Several Days   Over the past 2 weeks how often have you felt down, depressed, or hopeless? For Several Days  "      Sleep History Reviewed With Patient 11/19/2019   How many hours do you sleep at night? 7   Do you think that you snore loudly or has anybody ever heard you snore loudly (louder than talking or so loud it can be heard behind a shut door)? Yes   Has anyone seen or heard you stop breathing during your sleep? Yes   Do you often feel tired, fatigued, or sleepy during the day? Yes       MEDICATIONS:   Current Outpatient Medications   Medication Sig Dispense Refill     aspirin (NEMESIO ASPIRIN) 325 MG EC tablet Take 325 mg by mouth daily        ATENOLOL PO Take 25 mg by mouth daily       calcium carbonate (OS-BASSEM 600 MG [place]. [place]) 600 MG tablet Take 1 tablet by mouth daily       Cholecalciferol (VITAMIN D) 2000 units tablet Take 2,000 Units by mouth daily       Fexofenadine HCl (ALLEGRA PO) Take 180 mg by mouth daily       fluticasone (FLONASE) 50 MCG/ACT spray Spray 1 spray into both nostrils daily       fluticasone-salmeterol (ADVAIR) 250-50 MCG/DOSE diskus inhaler Inhale 1 puff into the lungs daily       MONTELUKAST SODIUM PO Take 10 mg by mouth At Bedtime       SYNTHROID 175 MCG tablet Take 1 tablet (175 mcg) by mouth daily 90 tablet 3     vitamin  s/Minerals TABS Take 1 tablet by mouth daily         ALLERGIES:   Allergies   Allergen Reactions     Codeine      Penicillins        PHYSICAL EXAM:  /80 (BP Location: Left arm, Patient Position: Sitting, Cuff Size: Adult Large)   Pulse 60   Temp 98.3  F (36.8  C) (Oral)   Ht 1.702 m (5' 7\")   Wt 108.2 kg (238 lb 8.6 oz)   SpO2 94%   BMI 37.36 kg/m      A & O x 3  HEENT: NCAT, mucous membranes moist  Respirations unlabored  Location of obesity: Mixed Obesity    Lab:      ASSESSMENT:  Magdalena is a patient with mature onset obesity with significant element of familial/genetic influence and with current health consequences. She does not need aggressive weight loss plan.  Magdalena Gordon eats a high carb diet, eats a high fat diet, uses food as a reward, uses " food as mood management and eats most meals in front of TV.    Her problem is complicated by strong craving/reward pathways and poor lifestyle choices    Her ability to lose weight is impacted by current work life and lack of confidence.    PLAN:    Decrease eating out  Purge house of food triggers  Keep food diary by taking a food picture  Dietician visit of education  Calorie/low fat diet  Meal planning  Increase activity as tolerated    Craving/Reward   Ancillary testing:  N/A.  Food Plan:  High protein/low carbohydrate.   Activity Plan:  Exercise after meals.  Supplementary:  N/A.   Medication: She is not interested in weight loss or diabetes medication at this time.    Healthy recipe given  Discussed health  and health psychologist referral -- she will think about it    RTC:    6-8 weeks    TIME: 45 min spent on evaluation, management, counseling, education, & motivational interviewing with greater than 50 % of the total time was spent on counseling and coordinating care    Sincerely,    Tano Blas MD

## 2021-08-03 NOTE — DISCHARGE INSTRUCTIONS
I am not sure what is causing your symptoms.  It could be that you are sick with a virus that is making you feel this way but so far all of your tests have returned reassuring.  Please return to the emergency department if you have more shortness of breath, lightheadedness, fevers, repeated vomiting, worsening headaches, or other concerns.  In the meantime use acetaminophen and ibuprofen and drink plenty of fluids.  If you are not feeling better in 2 days get rechecked.

## 2021-08-04 LAB
B BURGDOR IGG+IGM SER QL: 0.08
DEPRECATED CALCIDIOL+CALCIFEROL SERPL-MC: 32 UG/L (ref 20–75)

## 2021-08-05 LAB
SARS-COV-2 AB SERPL IA-ACNC: <0.4 U/ML
SARS-COV-2 AB SERPL QL IA: NEGATIVE

## 2021-10-11 ENCOUNTER — HEALTH MAINTENANCE LETTER (OUTPATIENT)
Age: 36
End: 2021-10-11

## 2022-01-27 DIAGNOSIS — F41.9 ANXIETY: ICD-10-CM

## 2022-01-27 RX ORDER — VENLAFAXINE HYDROCHLORIDE 150 MG/1
150 CAPSULE, EXTENDED RELEASE ORAL DAILY
Qty: 90 CAPSULE | Refills: 0 | Status: SHIPPED | OUTPATIENT
Start: 2022-01-27 | End: 2022-04-29

## 2022-01-27 NOTE — TELEPHONE ENCOUNTER
Routing refill request to provider for review/approval because:  PHQ9: 5 on 4/2021    Negrito Guthrie RN

## 2022-04-27 RX ORDER — LORATADINE 10 MG/1
TABLET ORAL
COMMUNITY
Start: 2021-07-01 | End: 2024-03-13

## 2022-04-28 ASSESSMENT — ENCOUNTER SYMPTOMS
NERVOUS/ANXIOUS: 0
SORE THROAT: 0
EYE PAIN: 0
FREQUENCY: 0
COUGH: 0
DIZZINESS: 0
HEMATURIA: 0
SHORTNESS OF BREATH: 0
ABDOMINAL PAIN: 0
NAUSEA: 0
FEVER: 0
DYSURIA: 0
CONSTIPATION: 0
MYALGIAS: 0
DIARRHEA: 0
JOINT SWELLING: 0
HEARTBURN: 0
WEAKNESS: 0
PARESTHESIAS: 0
BREAST MASS: 0
HEADACHES: 0
ARTHRALGIAS: 0
HEMATOCHEZIA: 0
PALPITATIONS: 0
CHILLS: 0

## 2022-04-29 ENCOUNTER — OFFICE VISIT (OUTPATIENT)
Dept: FAMILY MEDICINE | Facility: CLINIC | Age: 37
End: 2022-04-29
Payer: OTHER GOVERNMENT

## 2022-04-29 VITALS
DIASTOLIC BLOOD PRESSURE: 84 MMHG | HEIGHT: 64 IN | SYSTOLIC BLOOD PRESSURE: 130 MMHG | WEIGHT: 220.25 LBS | BODY MASS INDEX: 37.6 KG/M2 | TEMPERATURE: 97.4 F | OXYGEN SATURATION: 99 % | RESPIRATION RATE: 16 BRPM | HEART RATE: 94 BPM

## 2022-04-29 DIAGNOSIS — Z00.00 HEALTHCARE MAINTENANCE: Primary | ICD-10-CM

## 2022-04-29 DIAGNOSIS — G43.809 OTHER MIGRAINE WITHOUT STATUS MIGRAINOSUS, NOT INTRACTABLE: ICD-10-CM

## 2022-04-29 DIAGNOSIS — Z11.59 NEED FOR HEPATITIS C SCREENING TEST: ICD-10-CM

## 2022-04-29 DIAGNOSIS — Z30.41 ENCOUNTER FOR SURVEILLANCE OF CONTRACEPTIVE PILLS: ICD-10-CM

## 2022-04-29 DIAGNOSIS — E66.01 MORBID OBESITY (H): ICD-10-CM

## 2022-04-29 DIAGNOSIS — F41.9 ANXIETY: ICD-10-CM

## 2022-04-29 LAB
ANION GAP SERPL CALCULATED.3IONS-SCNC: 6 MMOL/L (ref 3–14)
BUN SERPL-MCNC: 11 MG/DL (ref 7–30)
CALCIUM SERPL-MCNC: 9.3 MG/DL (ref 8.5–10.1)
CHLORIDE BLD-SCNC: 107 MMOL/L (ref 94–109)
CHOLEST SERPL-MCNC: 288 MG/DL
CO2 SERPL-SCNC: 27 MMOL/L (ref 20–32)
CREAT SERPL-MCNC: 0.61 MG/DL (ref 0.52–1.04)
ERYTHROCYTE [DISTWIDTH] IN BLOOD BY AUTOMATED COUNT: 13.1 % (ref 10–15)
FASTING STATUS PATIENT QL REPORTED: YES
GFR SERPL CREATININE-BSD FRML MDRD: >90 ML/MIN/1.73M2
GLUCOSE BLD-MCNC: 93 MG/DL (ref 70–99)
HCT VFR BLD AUTO: 40.9 % (ref 35–47)
HDLC SERPL-MCNC: 47 MG/DL
HGB BLD-MCNC: 13 G/DL (ref 11.7–15.7)
LDLC SERPL CALC-MCNC: 197 MG/DL
MCH RBC QN AUTO: 27.5 PG (ref 26.5–33)
MCHC RBC AUTO-ENTMCNC: 31.8 G/DL (ref 31.5–36.5)
MCV RBC AUTO: 87 FL (ref 78–100)
NONHDLC SERPL-MCNC: 241 MG/DL
PLATELET # BLD AUTO: 360 10E3/UL (ref 150–450)
POTASSIUM BLD-SCNC: 4 MMOL/L (ref 3.4–5.3)
RBC # BLD AUTO: 4.72 10E6/UL (ref 3.8–5.2)
SODIUM SERPL-SCNC: 140 MMOL/L (ref 133–144)
TRIGL SERPL-MCNC: 221 MG/DL
TSH SERPL DL<=0.005 MIU/L-ACNC: 1.7 MU/L (ref 0.4–4)
WBC # BLD AUTO: 7.8 10E3/UL (ref 4–11)

## 2022-04-29 PROCEDURE — 99214 OFFICE O/P EST MOD 30 MIN: CPT | Mod: 25 | Performed by: FAMILY MEDICINE

## 2022-04-29 PROCEDURE — 36415 COLL VENOUS BLD VENIPUNCTURE: CPT | Performed by: FAMILY MEDICINE

## 2022-04-29 PROCEDURE — 85027 COMPLETE CBC AUTOMATED: CPT | Performed by: FAMILY MEDICINE

## 2022-04-29 PROCEDURE — 80048 BASIC METABOLIC PNL TOTAL CA: CPT | Performed by: FAMILY MEDICINE

## 2022-04-29 PROCEDURE — 80061 LIPID PANEL: CPT | Performed by: FAMILY MEDICINE

## 2022-04-29 PROCEDURE — 84443 ASSAY THYROID STIM HORMONE: CPT | Performed by: FAMILY MEDICINE

## 2022-04-29 PROCEDURE — 86803 HEPATITIS C AB TEST: CPT | Performed by: FAMILY MEDICINE

## 2022-04-29 PROCEDURE — 99395 PREV VISIT EST AGE 18-39: CPT | Performed by: FAMILY MEDICINE

## 2022-04-29 RX ORDER — LEVONORGESTREL/ETHIN.ESTRADIOL 0.1-0.02MG
1 TABLET ORAL DAILY
Qty: 84 TABLET | Refills: 4 | Status: SHIPPED | OUTPATIENT
Start: 2022-04-29 | End: 2023-05-10

## 2022-04-29 RX ORDER — SUMATRIPTAN 50 MG/1
50 TABLET, FILM COATED ORAL
Qty: 10 TABLET | Refills: 0 | Status: SHIPPED | OUTPATIENT
Start: 2022-04-29 | End: 2024-03-13

## 2022-04-29 RX ORDER — VENLAFAXINE HYDROCHLORIDE 37.5 MG/1
CAPSULE, EXTENDED RELEASE ORAL
Qty: 84 CAPSULE | Refills: 0 | Status: SHIPPED | OUTPATIENT
Start: 2022-04-29 | End: 2022-07-26

## 2022-04-29 NOTE — PROGRESS NOTES
"Answers for HPI/ROS submitted by the patient on 4/28/2022  Frequency of exercise:: None  Getting at least 3 servings of Calcium per day:: Yes  Diet:: Regular (no restrictions)  Taking medications regularly:: Yes  Medication side effects:: None  Bi-annual eye exam:: Yes  Dental care twice a year:: Yes  Sleep apnea or symptoms of sleep apnea:: Daytime drowsiness  abdominal pain: No  Blood in stool: No  Blood in urine: No  chest pain: No  chills: No  congestion: No  constipation: No  cough: No  diarrhea: No  dizziness: No  ear pain: No  eye pain: No  nervous/anxious: No  fever: No  frequency: No  genital sores: No  headaches: No  hearing loss: No  heartburn: No  arthralgias: No  joint swelling: No  peripheral edema: No  mood changes: No  myalgias: No  nausea: No  dysuria: No  palpitations: No  Skin sensation changes: No  sore throat: No  urgency: No  rash: Yes  shortness of breath: No  visual disturbance: No  weakness: No  pelvic pain: No  vaginal bleeding: No  vaginal discharge: No  tenderness: No  breast mass: No  breast discharge: No  Additional concerns today:: No        Assessment/Plan:     Health maintenance female exam.  All questions answered.  Pap recommended but declined.  She will be due in August of this year and will follow up with me in the next few months for a med check/Pap smear.  Breast self exam technique reviewed and patient encouraged to perform self-exam monthly.  Discussed healthy lifestyle modifications.  Await fasting lab results    BMI:   Estimated body mass index is 37.51 kg/m  as calculated from the following:    Height as of this encounter: 1.632 m (5' 4.25\").    Weight as of this encounter: 99.9 kg (220 lb 4 oz).   Weight management plan: Discussed healthy diet and exercise guidelines      Healthcare maintenance  Recommended Pap smear today patient declined she is on her menstrual cycle.  We will plan on coming back in the next few months to get this done and she is due in August.  - Pap " screen with HPV - recommended age 30 - 65 years  - Lipid panel reflex to direct LDL Fasting  - BASIC METABOLIC PANEL  - CBC with Platelets  - REVIEW OF HEALTH MAINTENANCE PROTOCOL ORDERS  - TSH with free T4 reflex  - Lipid panel reflex to direct LDL Fasting  - BASIC METABOLIC PANEL  - CBC with Platelets  - TSH with free T4 reflex    Anxiety  She will use 75 mg daily for 1 month and then go to 37.5 mg daily for 1 month and then stop the medication.  She will be following up with me 2 to 3 months for a Pap smear and to let me know how things are going.  - venlafaxine (EFFEXOR-XR) 37.5 MG 24 hr capsule  Dispense: 84 capsule; Refill: 0    Other migraine without status migrainosus, not intractable  Discussed her migraines today.  - SUMAtriptan (IMITREX) 50 MG tablet  Dispense: 10 tablet; Refill: 0  - BASIC METABOLIC PANEL  - TSH with free T4 reflex  - BASIC METABOLIC PANEL  - TSH with free T4 reflex    Need for hepatitis C screening test  - Hepatitis C Screen Reflex to HCV RNA Quant and Genotype  - Hepatitis C Screen Reflex to HCV RNA Quant and Genotype    Encounter for surveillance of contraceptive pills  Refill -discussed a progesterone only form of contraception that might help regulate her hormones back.  She states that she got pregnant while she is on the progesterone only pill but I do not think that this would likely be an issue with the IUD.  - levonorgestrel-ethinyl estradiol (AVIANE) 0.1-20 MG-MCG tablet  Dispense: 84 tablet; Refill: 4    Morbid obesity (H)  Discussed diet and exercise.        Patient has been advised of split billing requirements and indicates understanding: Yes      Subjective:     Tammy Obregon is a 36 year old female who presents for an annual exam.  She is overall doing well.    Anxiety: History of anxiety.  Currently on Effexor.  She is taking 150 mg daily now.  She would like to wean off of the medication.  She thinks it interrupts her sleep.  She does not feel as though she would  like a different medication but would like to try off of it to see how she does.    Migraines: Patient notes that she will get hormonal migraines around the time of her menstrual cycle as well as the time of ovulation.  No aura with these.  Generally one-sided.  She is unsure if her birth control pills help.  Generally they will resolve with ibuprofen but she had one recently that was very severe and lasted a few days.      Healthy Habits:   Regular Exercise: Yes  Sunscreen Use: Yes  Healthy Diet: yes  Dental Visits Regularly: yes  Seat Belt: Yes  Self Breast Exam Monthly: yes  Prevention of Osteoporosis: yes    Immunization History   Administered Date(s) Administered     COVID-19,PF,Pfizer (12+ Yrs) 2021, 2021     DT (PEDS <7y) 1999     HPV Quadrivalent 2007, 2008, 07/10/2008     HepB, Unspecified 1993, 10/01/1993, 1994     Influenza Vaccine IM > 6 months Valent IIV4 (Alfuria,Fluzone) 10/13/2017, 2019, 10/23/2020     Influenza Vaccine, 6+MO IM (QUADRIVALENT W/PRESERVATIVES) 10/12/2018     Meningococcal (Menactra ) 2005     TDAP Vaccine (Boostrix) 2007, 2017, 2018, 2019         Gynecologic History  Patient's last menstrual period was 2022 (exact date).  Contraception: oral contraceptive  Last Pap: . Results were: ASCUS with neg HPV      OB History    Para Term  AB Living   2 2 1 1 0 2   SAB IAB Ectopic Multiple Live Births   0 0 0 0 2      # Outcome Date GA Lbr Andrew/2nd Weight Sex Delivery Anes PTL Lv   2 Term 20 39w3d  3.82 kg (8 lb 6.8 oz) M CS-LTranv Spinal, EPI, IV N SUSAN      Complications: Dysfunctional Labor, Failure to Progress in First Stage      Name: VARUN LUIKARINASADIE      Apgar1: 1  Apgar5: 5   1  18 36w2d 12:17 / 01:05 2.83 kg (6 lb 3.8 oz) M Vag-Spont EPI, IV N SUSAN      Name: YOUNG,MALE-SADIE      Apgar1: 9  Apgar5: 9       Current Outpatient Medications   Medication Sig  Dispense Refill     levonorgestrel-ethinyl estradiol (AVIANE) 0.1-20 MG-MCG tablet Take 1 tablet by mouth daily 84 tablet 4     loratadine (CLARITIN) 10 MG tablet        Multiple Vitamins-Minerals (WOMENS MULTI VITAMIN & MINERAL) TABS        SUMAtriptan (IMITREX) 50 MG tablet Take 1 tablet (50 mg) by mouth at onset of headache for migraine May repeat in 2 hours. Max 4 tablets/24 hours. 10 tablet 0     venlafaxine (EFFEXOR-XR) 37.5 MG 24 hr capsule Take 2 capsules (75 mg) by mouth daily for 28 days, THEN 1 capsule (37.5 mg) daily for 28 days. 84 capsule 0     Past Medical History:   Diagnosis Date     Migraine      Past Surgical History:   Procedure Laterality Date      SECTION N/A 2020    Procedure:  SECTION;  Surgeon: So Cross MD;  Location: [facility];  Service: Obstetrics     OTHER SURGICAL HISTORY      dental surgery     Patient has no known allergies.  Family History   Problem Relation Age of Onset     Hyperlipidemia Father      Hearing Loss Maternal Grandfather      No Known Problems Paternal Grandmother      Hearing Loss Paternal Grandfather      Breast Cancer Maternal Aunt 40.00     Social History     Socioeconomic History     Marital status:      Spouse name: Not on file     Number of children: Not on file     Years of education: Not on file     Highest education level: Not on file   Occupational History     Not on file   Tobacco Use     Smoking status: Never Smoker     Smokeless tobacco: Never Used   Substance and Sexual Activity     Alcohol use: Not on file     Drug use: Not on file     Sexual activity: Not on file   Other Topics Concern     Not on file   Social History Narrative     Not on file     Social Determinants of Health     Financial Resource Strain: Not on file   Food Insecurity: Not on file   Transportation Needs: Not on file   Physical Activity: Not on file   Stress: Not on file   Social Connections: Not on file   Intimate Partner Violence: Not on  "file   Housing Stability: Not on file       Review of Systems  12 point review of systems was completed and found to be negative except for what is been stated above.      Objective:      Vitals:    04/29/22 0741 04/29/22 0842   BP: (!) 130/90 130/84   Pulse: 94    Resp: 16    Temp: 97.4  F (36.3  C)    TempSrc: Tympanic    SpO2: 99%    Weight: 99.9 kg (220 lb 4 oz)    Height: 1.632 m (5' 4.25\")          Physical Exam:  General Appearance: Alert, cooperative, no distress, appears stated age   Head: Normocephalic, without obvious abnormality, atraumatic  Eyes: PERRL, conjunctiva/corneas clear, EOM's intact   Ears: Normal TM's and external ear canals, both ears  Neck: Supple, symmetrical, trachea midline, no adenopathy;  thyroid: not enlarged, symmetric, no tenderness/mass/nodules  Back: Symmetric, no curvature, ROM normal,  Lungs: Clear to auscultation bilaterally, respirations unlabored  Breasts: No breast masses, tenderness, asymmetry, or nipple discharge.  Heart: Regular rate and rhythm, S1 and S2 normal, no murmur, rub, or gallop  Abdomen: Soft, non-tender, bowel sounds active all four quadrants,  no masses, no organomegaly  Pelvic:pt declined  Extremities: Extremities normal, atraumatic, no cyanosis or edema  Skin: Skin color, texture, turgor normal, no rashes or lesions  Lymph nodes: Cervical, supraclavicular, and axillary nodes normal and   Neurologic: Normal     "

## 2022-05-02 LAB — HCV AB SERPL QL IA: NONREACTIVE

## 2022-07-27 ENCOUNTER — OFFICE VISIT (OUTPATIENT)
Dept: FAMILY MEDICINE | Facility: CLINIC | Age: 37
End: 2022-07-27
Payer: OTHER GOVERNMENT

## 2022-07-27 VITALS
HEIGHT: 64 IN | WEIGHT: 214.25 LBS | TEMPERATURE: 98.1 F | BODY MASS INDEX: 36.58 KG/M2 | OXYGEN SATURATION: 98 % | DIASTOLIC BLOOD PRESSURE: 78 MMHG | RESPIRATION RATE: 16 BRPM | SYSTOLIC BLOOD PRESSURE: 114 MMHG | HEART RATE: 94 BPM

## 2022-07-27 DIAGNOSIS — Z00.00 HEALTHCARE MAINTENANCE: Primary | ICD-10-CM

## 2022-07-27 PROCEDURE — G0124 SCREEN C/V THIN LAYER BY MD: HCPCS | Performed by: PATHOLOGY

## 2022-07-27 PROCEDURE — G0145 SCR C/V CYTO,THINLAYER,RESCR: HCPCS | Performed by: FAMILY MEDICINE

## 2022-07-27 PROCEDURE — 99213 OFFICE O/P EST LOW 20 MIN: CPT | Performed by: FAMILY MEDICINE

## 2022-07-27 PROCEDURE — 87624 HPV HI-RISK TYP POOLED RSLT: CPT | Performed by: FAMILY MEDICINE

## 2022-07-27 NOTE — PROGRESS NOTES
Answers for HPI/ROS submitted by the patient on 7/25/2022  What is the reason for your visit today? : Pap smear and medication check-in  How many servings of fruits and vegetables do you eat daily?: 2-3  On average, how many sweetened beverages do you drink each day (Examples: soda, juice, sweet tea, etc.  Do NOT count diet or artificially sweetened beverages)?: 0  How many minutes a day do you exercise enough to make your heart beat faster?: 9 or less  How many days a week do you exercise enough to make your heart beat faster?: 3 or less  How many days per week do you miss taking your medication?: 0    Assessment/Plan:    Tammy Obregon is a 36 year old female presenting for:    Healthcare maintenance  PAP completed today.  - Pap screen with HPV - recommended age 30 - 65 years    Patient is otherwise doing well.  She is been able to wean off of her Effexor.  She been off of it completely for about 3 to 4 weeks.  She is still getting some slight brain buzzes.      Medications Discontinued During This Encounter   Medication Reason     venlafaxine (EFFEXOR-XR) 37.5 MG 24 hr capsule            Chief Complaint:  Gyn Exam and Recheck Medication        Subjective:   Tammy Obregon presents to the clinic today for a Pap smear.  She recently had a complete physical but was having her menstrual cycle so was unable to get Pap smear.  About 3 years ago she had a Pap which was ASCUS with negative HPV.    Patient is otherwise doing well.  She is been able to wean off of her Effexor.  She been off of it completely for about 3 to 4 weeks.  She is still getting some slight brain buzzes.  She thinks that they are improving    12 point review of systems completed and negative except for what has been described above    History   Smoking Status     Never Smoker   Smokeless Tobacco     Never Used         Current Outpatient Medications:      levonorgestrel-ethinyl estradiol (AVIANE) 0.1-20 MG-MCG tablet, Take 1 tablet by mouth  "daily, Disp: 84 tablet, Rfl: 4     loratadine (CLARITIN) 10 MG tablet, , Disp: , Rfl:      Multiple Vitamins-Minerals (WOMENS MULTI VITAMIN & MINERAL) TABS, , Disp: , Rfl:      SUMAtriptan (IMITREX) 50 MG tablet, Take 1 tablet (50 mg) by mouth at onset of headache for migraine May repeat in 2 hours. Max 4 tablets/24 hours., Disp: 10 tablet, Rfl: 0      Objective:  Vitals:    07/27/22 0714   BP: 114/78   Pulse: 94   Resp: 16   Temp: 98.1  F (36.7  C)   TempSrc: Tympanic   SpO2: 98%   Weight: 97.2 kg (214 lb 4 oz)   Height: 1.632 m (5' 4.25\")       Body mass index is 36.49 kg/m .    Vital signs reviewed and stable  General: No acute distress  Psych: Appropriate affect  HEENT: moist mucous membranes  Abdomen: soft, non tender, non distended with normo-active bowel sounds  Genitourinary: Normal external female genitalia, normal-appearing vaginal mucosa and cervix.  Extremities: warm and well perfused with no edema  Skin: warm and dry with no rash         This note has been dictated and transcribed using voice recognition software.   Any errors in transcription are unintentional and inherent to the software.  "

## 2022-08-02 LAB
BKR LAB AP GYN ADEQUACY: NORMAL
BKR LAB AP GYN INTERPRETATION: NORMAL
BKR LAB AP HPV REFLEX: NORMAL
BKR LAB AP PREVIOUS ABNL DX: NORMAL
BKR LAB AP PREVIOUS ABNORMAL: NORMAL
PATH REPORT.COMMENTS IMP SPEC: NORMAL
PATH REPORT.COMMENTS IMP SPEC: NORMAL
PATH REPORT.RELEVANT HX SPEC: NORMAL

## 2022-08-04 PROBLEM — R87.610 ASCUS OF CERVIX WITH NEGATIVE HIGH RISK HPV: Status: RESOLVED | Noted: 2019-08-12 | Resolved: 2022-08-04

## 2022-08-04 LAB
HUMAN PAPILLOMA VIRUS 16 DNA: NEGATIVE
HUMAN PAPILLOMA VIRUS 18 DNA: NEGATIVE
HUMAN PAPILLOMA VIRUS FINAL DIAGNOSIS: NORMAL
HUMAN PAPILLOMA VIRUS OTHER HR: NEGATIVE

## 2022-09-25 ENCOUNTER — HEALTH MAINTENANCE LETTER (OUTPATIENT)
Age: 37
End: 2022-09-25

## 2022-10-11 ENCOUNTER — OFFICE VISIT (OUTPATIENT)
Dept: FAMILY MEDICINE | Facility: CLINIC | Age: 37
End: 2022-10-11
Payer: OTHER GOVERNMENT

## 2022-10-11 VITALS
HEART RATE: 86 BPM | BODY MASS INDEX: 35.66 KG/M2 | DIASTOLIC BLOOD PRESSURE: 84 MMHG | RESPIRATION RATE: 16 BRPM | OXYGEN SATURATION: 99 % | WEIGHT: 209.4 LBS | TEMPERATURE: 98.6 F | SYSTOLIC BLOOD PRESSURE: 131 MMHG

## 2022-10-11 DIAGNOSIS — J02.9 PHARYNGITIS, UNSPECIFIED ETIOLOGY: Primary | ICD-10-CM

## 2022-10-11 LAB
DEPRECATED S PYO AG THROAT QL EIA: NEGATIVE
GROUP A STREP BY PCR: NOT DETECTED

## 2022-10-11 PROCEDURE — 87651 STREP A DNA AMP PROBE: CPT | Performed by: PHYSICIAN ASSISTANT

## 2022-10-11 PROCEDURE — 99213 OFFICE O/P EST LOW 20 MIN: CPT | Performed by: PHYSICIAN ASSISTANT

## 2022-10-11 NOTE — PROGRESS NOTES
Pharyngitis, unspecified etiology  - Streptococcus A Rapid Screen w/Reflex to PCR - Clinic Collect  - Group A Streptococcus PCR Throat Swab    Age 12 months or more  Okay to use Zarbee's   Okay to use Rx Children Tylenol if prescribed (Dose based on weight)    Age 2-12:   Okay to use Children Motrin or Tylenol over the counter.    Adults:  Okay to take acetaminophen 500 mg- 2 tabs (Total of 1000 mg) every 8 hrs   Okay to take ibuprofen 200 mg- 3 tabs (Total of 600 mg) every 6 hours        Okay to use Neti pot for sinus lavage up to three times daily for congestion and sinus pressure if present. Daily hot shower can be beneficial for congestion and body aches. Okay to use bedroom vaporizer or humidifier if symptoms are worse at night. Nightly Vicks Vapor rub and 5-10 mg of Melatonin okay to use for sleep.     Over the counter cough medication and decongestants okay if not prescribed by me during this visit. For homeopathic alternatives to cough syrup and decongestant, feel free to try Elderberry extract.    Okay to use salt water gargles, warm tea (or warm water with lemon and honey), and lozenges for any throat discomfort. Chloraseptic spray is also highly encourages for throat pain/irritation.     Patient will need to get plenty of rest and drink at least 1.5-2 liters of fluids daily for adults and 1-1.5 liters for children. If vomiting and not tolerating liquids for more than 24 hrs, please go to your nearest emergency department for IV fluids and further treatment.     Patient is not contagious after 1 week from start of symptoms. If possible, wear mask for first 7 days. Wash hands regularly and vigorously for 30 seconds often.     30 minutes spent on the date of the encounter doing chart review, history and exam, documentation and further activities per the note    Pascual Ortiz PA-C  [facility]    Subjective   37 year old who presents to clinic today for the following health  issues:    Throat Problem       HPI     Acute Illness  Acute illness concerns: Sore throat  Onset/Duration: Since sunday  Symptoms:  Fever: No  Chills/Sweats: YES  Headache (location?): No  Sinus Pressure: No  Conjunctivitis:  No  Ear Pain: no  Rhinorrhea: No  Congestion: YES  Sore Throat: YES  Cough: YES- Mild  Wheeze: No  Decreased Appetite: No  Nausea: No  Vomiting: No  Diarrhea: No  Dysuria/Freq.: No   Dysuria or Hematuria: No  Fatigue/Achiness: YES  Sick/Strep Exposure: Kids have a cold  Therapies tried and outcome: Advil    Review of Systems   Review of Systems   See HPI    Objective    Temp: 98.6  F (37  C) Temp src: Oral BP: 131/84 Pulse: 86   Resp: 16 SpO2: 99 %     Body mass index is 35.66 kg/m .    Physical Exam   Physical Exam  Constitutional:       General: She is not in acute distress.     Appearance: Normal appearance. She is obese. She is not ill-appearing, toxic-appearing or diaphoretic.   HENT:      Head: Normocephalic and atraumatic.      Right Ear: Tympanic membrane, ear canal and external ear normal. There is no impacted cerumen.      Left Ear: Tympanic membrane, ear canal and external ear normal. There is no impacted cerumen.      Nose: Nose normal. No congestion or rhinorrhea.      Mouth/Throat:      Mouth: Mucous membranes are moist.      Pharynx: Posterior oropharyngeal erythema present. No oropharyngeal exudate.   Cardiovascular:      Rate and Rhythm: Normal rate and regular rhythm.      Pulses: Normal pulses.      Heart sounds: Normal heart sounds. No murmur heard.    No friction rub. No gallop.      Comments: Unable to hear heart sounds appropriately due to body habitus.  Pulmonary:      Effort: Pulmonary effort is normal. No respiratory distress.      Breath sounds: Normal breath sounds. No stridor. No wheezing, rhonchi or rales.   Chest:      Chest wall: No tenderness.   Lymphadenopathy:      Cervical: Cervical adenopathy present.   Neurological:      Mental Status: She is alert.    Psychiatric:         Mood and Affect: Mood normal.         Behavior: Behavior normal.         Thought Content: Thought content normal.         Judgment: Judgment normal.          Results for orders placed or performed in visit on 10/11/22 (from the past 24 hour(s))   Streptococcus A Rapid Screen w/Reflex to PCR - Clinic Collect    Specimen: Throat; Swab   Result Value Ref Range    Group A Strep antigen Negative Negative

## 2023-05-07 ASSESSMENT — ENCOUNTER SYMPTOMS
NERVOUS/ANXIOUS: 1
NAUSEA: 0
HEARTBURN: 0
COUGH: 0
HEADACHES: 0
DYSURIA: 0
SORE THROAT: 0
DIZZINESS: 0
PALPITATIONS: 0
JOINT SWELLING: 0
DIARRHEA: 0
MYALGIAS: 0
CONSTIPATION: 0
FREQUENCY: 0
ARTHRALGIAS: 0
ABDOMINAL PAIN: 0
FEVER: 0
WEAKNESS: 0
HEMATURIA: 0
CHILLS: 0
HEMATOCHEZIA: 0
PARESTHESIAS: 0
EYE PAIN: 0
SHORTNESS OF BREATH: 0
BREAST MASS: 0

## 2023-05-10 ENCOUNTER — OFFICE VISIT (OUTPATIENT)
Dept: FAMILY MEDICINE | Facility: CLINIC | Age: 38
End: 2023-05-10
Payer: OTHER GOVERNMENT

## 2023-05-10 VITALS
WEIGHT: 199.25 LBS | OXYGEN SATURATION: 98 % | SYSTOLIC BLOOD PRESSURE: 118 MMHG | HEIGHT: 64 IN | TEMPERATURE: 98.2 F | RESPIRATION RATE: 16 BRPM | DIASTOLIC BLOOD PRESSURE: 80 MMHG | BODY MASS INDEX: 34.02 KG/M2 | HEART RATE: 88 BPM

## 2023-05-10 DIAGNOSIS — Z30.41 ENCOUNTER FOR SURVEILLANCE OF CONTRACEPTIVE PILLS: ICD-10-CM

## 2023-05-10 DIAGNOSIS — Z00.00 ROUTINE GENERAL MEDICAL EXAMINATION AT A HEALTH CARE FACILITY: Primary | ICD-10-CM

## 2023-05-10 DIAGNOSIS — H10.13 ALLERGIC CONJUNCTIVITIS, BILATERAL: ICD-10-CM

## 2023-05-10 PROBLEM — E66.01 MORBID OBESITY (H): Status: RESOLVED | Noted: 2022-04-29 | Resolved: 2023-05-10

## 2023-05-10 LAB
ANION GAP SERPL CALCULATED.3IONS-SCNC: 13 MMOL/L (ref 7–15)
BUN SERPL-MCNC: 8 MG/DL (ref 6–20)
CALCIUM SERPL-MCNC: 9.8 MG/DL (ref 8.6–10)
CHLORIDE SERPL-SCNC: 102 MMOL/L (ref 98–107)
CHOLEST SERPL-MCNC: 245 MG/DL
CREAT SERPL-MCNC: 0.66 MG/DL (ref 0.51–0.95)
DEPRECATED HCO3 PLAS-SCNC: 25 MMOL/L (ref 22–29)
ERYTHROCYTE [DISTWIDTH] IN BLOOD BY AUTOMATED COUNT: 12.6 % (ref 10–15)
GFR SERPL CREATININE-BSD FRML MDRD: >90 ML/MIN/1.73M2
GLUCOSE SERPL-MCNC: 89 MG/DL (ref 70–99)
HCT VFR BLD AUTO: 40.3 % (ref 35–47)
HDLC SERPL-MCNC: 53 MG/DL
HGB BLD-MCNC: 13.1 G/DL (ref 11.7–15.7)
LDLC SERPL CALC-MCNC: 159 MG/DL
MCH RBC QN AUTO: 27.3 PG (ref 26.5–33)
MCHC RBC AUTO-ENTMCNC: 32.5 G/DL (ref 31.5–36.5)
MCV RBC AUTO: 84 FL (ref 78–100)
NONHDLC SERPL-MCNC: 192 MG/DL
PLATELET # BLD AUTO: 381 10E3/UL (ref 150–450)
POTASSIUM SERPL-SCNC: 4.5 MMOL/L (ref 3.4–5.3)
RBC # BLD AUTO: 4.8 10E6/UL (ref 3.8–5.2)
SODIUM SERPL-SCNC: 140 MMOL/L (ref 136–145)
TRIGL SERPL-MCNC: 167 MG/DL
TSH SERPL DL<=0.005 MIU/L-ACNC: 2.28 UIU/ML (ref 0.3–4.2)
WBC # BLD AUTO: 9.7 10E3/UL (ref 4–11)

## 2023-05-10 PROCEDURE — 36415 COLL VENOUS BLD VENIPUNCTURE: CPT | Performed by: FAMILY MEDICINE

## 2023-05-10 PROCEDURE — 82306 VITAMIN D 25 HYDROXY: CPT | Performed by: FAMILY MEDICINE

## 2023-05-10 PROCEDURE — 85027 COMPLETE CBC AUTOMATED: CPT | Performed by: FAMILY MEDICINE

## 2023-05-10 PROCEDURE — 99395 PREV VISIT EST AGE 18-39: CPT | Performed by: FAMILY MEDICINE

## 2023-05-10 PROCEDURE — 80048 BASIC METABOLIC PNL TOTAL CA: CPT | Performed by: FAMILY MEDICINE

## 2023-05-10 PROCEDURE — 80061 LIPID PANEL: CPT | Performed by: FAMILY MEDICINE

## 2023-05-10 PROCEDURE — 84443 ASSAY THYROID STIM HORMONE: CPT | Performed by: FAMILY MEDICINE

## 2023-05-10 RX ORDER — LEVONORGESTREL/ETHIN.ESTRADIOL 0.1-0.02MG
1 TABLET ORAL DAILY
Qty: 84 TABLET | Refills: 4 | Status: SHIPPED | OUTPATIENT
Start: 2023-05-10 | End: 2024-03-13

## 2023-05-10 RX ORDER — AZELASTINE HYDROCHLORIDE 0.5 MG/ML
1 SOLUTION/ DROPS OPHTHALMIC 2 TIMES DAILY
Qty: 6 ML | Refills: 11 | Status: SHIPPED | OUTPATIENT
Start: 2023-05-10 | End: 2024-03-13

## 2023-05-10 ASSESSMENT — ENCOUNTER SYMPTOMS
PALPITATIONS: 0
HEARTBURN: 0
FEVER: 0
MYALGIAS: 0
CHILLS: 0
ABDOMINAL PAIN: 0
DIARRHEA: 0
EYE PAIN: 0
COUGH: 0
CONSTIPATION: 0
ARTHRALGIAS: 0
HEMATOCHEZIA: 0
SORE THROAT: 0
FREQUENCY: 0
SHORTNESS OF BREATH: 0
WEAKNESS: 0
DYSURIA: 0
NERVOUS/ANXIOUS: 1
HEADACHES: 0
BREAST MASS: 0
JOINT SWELLING: 0
NAUSEA: 0
HEMATURIA: 0
PARESTHESIAS: 0
DIZZINESS: 0

## 2023-05-10 NOTE — PROGRESS NOTES
SUBJECTIVE:   CC: Tiffanie is an 37 year old who presents for preventive health visit.        View : No data to display.              Patient has been advised of split billing requirements and indicates understanding: Yes     Healthy Habits:     Getting at least 3 servings of Calcium per day:  NO    Bi-annual eye exam:  Yes    Dental care twice a year:  Yes    Sleep apnea or symptoms of sleep apnea:  Daytime drowsiness    Diet:  Regular (no restrictions)    Frequency of exercise:  None    Taking medications regularly:  Yes    Medication side effects:  None    PHQ-2 Total Score: 2    Additional concerns today:  Yes    She is overall doing well.  She is very pleased that the weather is a bit nicer and she can get outside with her children.    She needs a refill of her birth control pills which are working well for her.    She has a history of allergies in the spring particularly.  She taking Claritin.  She notes that she has a lot of eye symptoms.  She is using over-the-counter eyedrops but does not use any prescription.  She does not use any nasal sprays either.    She notes that she is tired throughout the day.  She states that her 3-year-old has been waking her up at night and she feels as though this might be the culprit.  Otherwise she states that her mood has been a bit down but she thinks it will be a bit better now that the weather is nicer.    Today's PHQ-2 Score:       5/7/2023     7:57 PM   PHQ-2 ( 1999 [employer])   Q1: Little interest or pleasure in doing things 1   Q2: Feeling down, depressed or hopeless 1   PHQ-2 Score 2   Q1: Little interest or pleasure in doing things Several days    Several days   Q2: Feeling down, depressed or hopeless Several days    Several days   PHQ-2 Score 2    2       Have you ever done Advance Care Planning? (For example, a Health Directive, POLST, or a discussion with a medical provider or your loved ones about your wishes): No, advance care planning information given to  patient to review.  Patient plans to discuss their wishes with loved ones or provider.      Social History     Tobacco Use     Smoking status: Never     Smokeless tobacco: Never   Vaping Use     Vaping status: Not on file   Substance Use Topics     Alcohol use: Not on file             5/7/2023     7:57 PM   Alcohol Use   Prescreen: >3 drinks/day or >7 drinks/week? No     Reviewed orders with patient.  Reviewed health maintenance and updated orders accordingly - Yes  Lab work is in process    Breast Cancer Screening:    FHS-7:       4/27/2021     8:02 PM 4/28/2022     7:16 PM 5/7/2023     7:59 PM   Breast CA Risk Assessment (FHS-7)   Did any of your first-degree relatives have breast or ovarian cancer? No No No   Did any of your relatives have bilateral breast cancer? No No Unknown   Did any man in your family have breast cancer? No No No   Did any woman in your family have breast and ovarian cancer? No No No   Did any woman in your family have breast cancer before age 50 y? Yes Yes Yes   Do you have 2 or more relatives with breast and/or ovarian cancer? No Yes No   Do you have 2 or more relatives with breast and/or bowel cancer? Yes Yes Yes       Patient under 40 years of age: Routine Mammogram Screening not recommended.   Pertinent mammograms are reviewed under the imaging tab.    History of abnormal Pap smear: NO - age 30-65 PAP every 5 years with negative HPV co-testing recommended      Latest Ref Rng & Units 7/27/2022     7:18 AM 8/12/2019     5:13 PM 4/28/2017     8:19 AM   PAP / HPV   PAP  Negative for Intraepithelial Lesion or Malignancy (NILM)   Atypical squamous cells of undetermined significance  Electronically signed by Franck Nuno MD on 8/20/2019 at  6:01 PM     Negative for squamous intraepithelial lesion or malignancy  Electronically signed by Paola Botello CT (ASCP) on 5/4/2017 at  3:15 PM       HPV 16 DNA Negative Negative   Negative      HPV 18 DNA Negative Negative   Negative     "  Other HR HPV Negative Negative   Negative        Reviewed and updated as needed this visit by clinical staff    Allergies  Meds              Reviewed and updated as needed this visit by Provider                     Review of Systems   Constitutional: Negative for chills and fever.   HENT: Negative for congestion, ear pain, hearing loss and sore throat.    Eyes: Negative for pain and visual disturbance.   Respiratory: Negative for cough and shortness of breath.    Cardiovascular: Negative for chest pain, palpitations and peripheral edema.   Gastrointestinal: Negative for abdominal pain, constipation, diarrhea, heartburn, hematochezia and nausea.   Breasts:  Negative for tenderness, breast mass and discharge.   Genitourinary: Negative for dysuria, frequency, genital sores, hematuria, pelvic pain, urgency, vaginal bleeding and vaginal discharge.   Musculoskeletal: Negative for arthralgias, joint swelling and myalgias.   Skin: Negative for rash.   Neurological: Negative for dizziness, weakness, headaches and paresthesias.   Psychiatric/Behavioral: Negative for mood changes. The patient is nervous/anxious.       OBJECTIVE:   /80   Pulse 88   Temp 98.2  F (36.8  C) (Tympanic)   Resp 16   Ht 1.63 m (5' 4.17\")   Wt 90.4 kg (199 lb 4 oz)   LMP 04/26/2023 (Approximate)   SpO2 98%   BMI 34.02 kg/m    Physical Exam    Physical Exam:  General Appearance: Alert, cooperative, no distress, appears stated age   Head: Normocephalic, without obvious abnormality, atraumatic  Eyes: PERRL, conjunctiva/corneas clear, EOM's intact   Ears: Normal TM's and external ear canals, both ears  Nose:Nares normal, septum midline,mucosa normal, no drainage    Throat:Lips, mucosa, and tongue normal; teeth and gums normal  Neck: Supple, symmetrical, trachea midline, no adenopathy;  thyroid: not enlarged, symmetric, no tenderness/mass/nodules  Back: Symmetric, no curvature, ROM normal,  Lungs: Clear to auscultation bilaterally, " "respirations unlabored  Breasts: No breast masses, tenderness, asymmetry, or nipple discharge.  Heart: Regular rate and rhythm, S1 and S2 normal, no murmur, rub, or gallop  Abdomen: Soft, non-tender, bowel sounds active all four quadrants,  no masses, no organomegaly  Extremities: Extremities normal, atraumatic, no cyanosis or edema  Skin: Skin color, texture, turgor normal, no rashes or lesions  Lymph nodes: Cervical, supraclavicular, and axillary nodes normal and   Neurologic: Normal      ASSESSMENT/PLAN:   1. Routine general medical examination at a health care facility  Continue to stay physically active.  Continue to watch diet.  Strategies discussed to help her son self soothe at night so she can sleep.  She will let me know if she feels as though she needs anything for mood.  Laboratory testing done below for screening purposes as well as history of fatigue.    - Lipid panel reflex to direct LDL Fasting; Future  - TSH with free T4 reflex; Future  - Vitamin D Deficiency; Future  - CBC with Platelets; Future  - BASIC METABOLIC PANEL; Future  - Lipid panel reflex to direct LDL Fasting  - TSH with free T4 reflex  - Vitamin D Deficiency  - CBC with Platelets  - BASIC METABOLIC PANEL    2. Encounter for surveillance of contraceptive pills  Refill contraception  - levonorgestrel-ethinyl estradiol (AVIANE) 0.1-20 MG-MCG tablet; Take 1 tablet by mouth daily  Dispense: 84 tablet; Refill: 4    3. Allergic conjunctivitis, bilateral  Optivar eyedrops to the pharmacy  - azelastine (OPTIVAR) 0.05 % ophthalmic solution; Place 1 drop into both eyes 2 times daily  Dispense: 6 mL; Refill: 11      Patient has been advised of split billing requirements and indicates understanding: Yes      COUNSELING:  Reviewed preventive health counseling, as reflected in patient instructions      BMI:   Estimated body mass index is 34.02 kg/m  as calculated from the following:    Height as of this encounter: 1.63 m (5' 4.17\").    Weight as of " this encounter: 90.4 kg (199 lb 4 oz).   Weight management plan: Discussed healthy diet and exercise guidelines      She reports that she has never smoked. She has never used smokeless tobacco.          Skylar Arceo MD  [facility]

## 2023-05-11 LAB — DEPRECATED CALCIDIOL+CALCIFEROL SERPL-MC: 37 UG/L (ref 20–75)

## 2023-09-04 ASSESSMENT — ANXIETY QUESTIONNAIRES
5. BEING SO RESTLESS THAT IT IS HARD TO SIT STILL: SEVERAL DAYS
GAD7 TOTAL SCORE: 10
6. BECOMING EASILY ANNOYED OR IRRITABLE: MORE THAN HALF THE DAYS
IF YOU CHECKED OFF ANY PROBLEMS ON THIS QUESTIONNAIRE, HOW DIFFICULT HAVE THESE PROBLEMS MADE IT FOR YOU TO DO YOUR WORK, TAKE CARE OF THINGS AT HOME, OR GET ALONG WITH OTHER PEOPLE: SOMEWHAT DIFFICULT
2. NOT BEING ABLE TO STOP OR CONTROL WORRYING: SEVERAL DAYS
7. FEELING AFRAID AS IF SOMETHING AWFUL MIGHT HAPPEN: SEVERAL DAYS
1. FEELING NERVOUS, ANXIOUS, OR ON EDGE: MORE THAN HALF THE DAYS
GAD7 TOTAL SCORE: 10
3. WORRYING TOO MUCH ABOUT DIFFERENT THINGS: SEVERAL DAYS
4. TROUBLE RELAXING: MORE THAN HALF THE DAYS

## 2023-09-06 ENCOUNTER — OFFICE VISIT (OUTPATIENT)
Dept: FAMILY MEDICINE | Facility: CLINIC | Age: 38
End: 2023-09-06
Payer: OTHER GOVERNMENT

## 2023-09-06 VITALS
HEART RATE: 99 BPM | WEIGHT: 204.25 LBS | OXYGEN SATURATION: 98 % | RESPIRATION RATE: 16 BRPM | HEIGHT: 64 IN | TEMPERATURE: 98.4 F | SYSTOLIC BLOOD PRESSURE: 138 MMHG | BODY MASS INDEX: 34.87 KG/M2 | DIASTOLIC BLOOD PRESSURE: 78 MMHG

## 2023-09-06 DIAGNOSIS — H92.02 LEFT EAR PAIN: Primary | ICD-10-CM

## 2023-09-06 DIAGNOSIS — G43.809 OTHER MIGRAINE WITHOUT STATUS MIGRAINOSUS, NOT INTRACTABLE: ICD-10-CM

## 2023-09-06 PROCEDURE — 99214 OFFICE O/P EST MOD 30 MIN: CPT | Performed by: FAMILY MEDICINE

## 2023-09-06 RX ORDER — CIPROFLOXACIN AND DEXAMETHASONE 3; 1 MG/ML; MG/ML
4 SUSPENSION/ DROPS AURICULAR (OTIC) 2 TIMES DAILY
Qty: 7.5 ML | Refills: 0 | Status: SHIPPED | OUTPATIENT
Start: 2023-09-06 | End: 2024-03-13

## 2023-09-06 RX ORDER — TOPIRAMATE 25 MG/1
TABLET, FILM COATED ORAL
Qty: 46 TABLET | Refills: 0 | Status: SHIPPED | OUTPATIENT
Start: 2023-09-06 | End: 2024-03-13

## 2023-09-06 NOTE — PROGRESS NOTES
Assessment/Plan:    Tammy Obregon is a 38 year old female presenting for:    Left ear pain  Ear examination is normal today.  Ciprodex drops sent to see if this will help with her symptoms.  I suspect that this is more of a spasm rather than infection.  Unclear if this is related to her migraines and will start her on daily Topamax as noted below.  Could refer to ENT as well.    - ciprofloxacin-dexAMETHasone (CIPRODEX) 0.3-0.1 % otic suspension  Dispense: 7.5 mL; Refill: 0    Other migraine without status migrainosus, not intractable  Headaches seem to be worsening.  Encouraged her to find ways to decrease stress at job.  Topamax sent to the pharmacy.  Discussed risks and benefits of the medication.  Discussed most common side effects.  Discussed starting at 25 mg daily for 2 weeks and then increasing to 50 mg daily.  She will reach out to me in 3 to 4 weeks to let me know how she is doing.  - topiramate (TOPAMAX) 25 MG tablet  Dispense: 46 tablet; Refill: 0      There are no discontinued medications.        Chief Complaint:  Otalgia        Subjective:   Tammy Obregon is a pleasant 38-year-old female who presents to the clinic today with 2 concerns.    1.  Left ear pain: Patient notes that 3-4 times a week she will get 5 to 10 seconds of intense sharp left ear pain.  Nothing seems to bring this on.  It tends to dissipate on its own.  It does not cause any hearing issues.  She has noted this for about a month intermittently.  It does not seem to be worsening.  She has not been sick recently.    2.  Migraines: Patient has a past medical history significant for migraines.  Her job was a bit more stressful than it has been in the past and she notes that she is having up to 10 migraines per month.  She usually just takes ibuprofen with these which is helpful.  She notes that she has Imitrex but forgets to take it.    12 point review of systems completed and negative except for what has been described  "above    History   Smoking Status    Never   Smokeless Tobacco    Never         Current Outpatient Medications:     ciprofloxacin-dexAMETHasone (CIPRODEX) 0.3-0.1 % otic suspension, Place 4 drops Into the left ear 2 times daily, Disp: 7.5 mL, Rfl: 0    levonorgestrel-ethinyl estradiol (AVIANE) 0.1-20 MG-MCG tablet, Take 1 tablet by mouth daily, Disp: 84 tablet, Rfl: 4    loratadine (CLARITIN) 10 MG tablet, , Disp: , Rfl:     Multiple Vitamins-Minerals (WOMENS MULTI VITAMIN & MINERAL) TABS, , Disp: , Rfl:     SUMAtriptan (IMITREX) 50 MG tablet, Take 1 tablet (50 mg) by mouth at onset of headache for migraine May repeat in 2 hours. Max 4 tablets/24 hours., Disp: 10 tablet, Rfl: 0    topiramate (TOPAMAX) 25 MG tablet, Take 1 tablet (25 mg) by mouth daily for 14 days, THEN 2 tablets (50 mg) daily for 16 days., Disp: 46 tablet, Rfl: 0    azelastine (OPTIVAR) 0.05 % ophthalmic solution, Place 1 drop into both eyes 2 times daily (Patient not taking: Reported on 9/6/2023), Disp: 6 mL, Rfl: 11      Objective:  Vitals:    09/06/23 0949   BP: 138/78   Pulse: 99   Resp: 16   Temp: 98.4  F (36.9  C)   TempSrc: Tympanic   SpO2: 98%   Weight: 92.6 kg (204 lb 4 oz)   Height: 1.63 m (5' 4.17\")       Body mass index is 34.87 kg/m .    Vital signs reviewed and stable  General: No acute distress  Psych: Appropriate affect  HEENT: moist mucous membranes, pupils equal, round, reactive to light and accomodation, tympanic membranes are pearly grey bilaterally  Lymph: no cervical or supraclavicular lymphadenopathy  Cardiovascular: regular rate and rhythm with no murmur  Pulmonary: clear to auscultation bilaterally with no wheeze  Abdomen: soft, non tender, non distended with normo-active bowel sounds  Extremities: warm and well perfused with no edema  Skin: warm and dry with no rash         This note has been dictated and transcribed using voice recognition software.   Any errors in transcription are unintentional and inherent to the " software.    Answers submitted by the patient for this visit:  JIMBO-7 (Submitted on 9/4/2023)  JIMBO 7 TOTAL SCORE: 10  Migraine Visit Questionnaire (Submitted on 9/4/2023)  Chief Complaint: Chronic problems general questions HPI Form  Headache Symptoms are: worsened  How often are you getting headaches or migraines? : At least 10 days/month  Are you able to do normal daily activities when you have a migraine?: Yes  Migraine Rescue/Relief Medications:: Ibuprofen (Advil, Motrin)  Effectiveness of rescue/relief medications:: The relief is inconsistent  Migraine Preventative Medications:: no medications to prevent migraines  ER or UC Visits:: 0 times  Depression / Anxiety Questionnaire (Submitted on 9/4/2023)  Chief Complaint: Chronic problems general questions HPI Form  Depression/Anxiety: Anxiety  Anxiety only (Submitted on 9/4/2023)  Chief Complaint: Chronic problems general questions HPI Form  Anxiety since last: : medium  Other associated symotome: : Yes  Significant life event: : No  Anxious:: Yes  Current substance use:: No  General Questionnaire (Submitted on 9/4/2023)  Chief Complaint: Chronic problems general questions HPI Form  How many servings of fruits and vegetables do you eat daily?: 2-3  On average, how many sweetened beverages do you drink each day (Examples: soda, juice, sweet tea, etc.  Do NOT count diet or artificially sweetened beverages)?: 1  How many minutes a day do you exercise enough to make your heart beat faster?: 9 or less  How many days a week do you exercise enough to make your heart beat faster?: 3 or less  How many days per week do you miss taking your medication?: 0  General Concern (Submitted on 9/4/2023)  Chief Complaint: Chronic problems general questions HPI Form  What is the reason for your visit today?: Main reason - occasional sharp ear pain  When did your symptoms begin?: More than a month  What are your symptoms?: Sharp pain in left ear - comes on suddenly and lasts only a few  seconds  How would you describe these symptoms?: Moderate  Are your symptoms:: Staying the same  Have you had these symptoms before?: No

## 2023-11-04 ENCOUNTER — IMMUNIZATION (OUTPATIENT)
Dept: FAMILY MEDICINE | Facility: CLINIC | Age: 38
End: 2023-11-04
Payer: OTHER GOVERNMENT

## 2023-11-04 PROCEDURE — 90686 IIV4 VACC NO PRSV 0.5 ML IM: CPT

## 2023-11-04 PROCEDURE — 90471 IMMUNIZATION ADMIN: CPT

## 2024-03-11 ASSESSMENT — PATIENT HEALTH QUESTIONNAIRE - PHQ9
10. IF YOU CHECKED OFF ANY PROBLEMS, HOW DIFFICULT HAVE THESE PROBLEMS MADE IT FOR YOU TO DO YOUR WORK, TAKE CARE OF THINGS AT HOME, OR GET ALONG WITH OTHER PEOPLE: SOMEWHAT DIFFICULT
SUM OF ALL RESPONSES TO PHQ QUESTIONS 1-9: 14
SUM OF ALL RESPONSES TO PHQ QUESTIONS 1-9: 14

## 2024-03-12 ENCOUNTER — OFFICE VISIT (OUTPATIENT)
Dept: FAMILY MEDICINE | Facility: CLINIC | Age: 39
End: 2024-03-12
Payer: OTHER GOVERNMENT

## 2024-03-12 VITALS
TEMPERATURE: 98.3 F | OXYGEN SATURATION: 100 % | BODY MASS INDEX: 30.93 KG/M2 | WEIGHT: 181.2 LBS | HEIGHT: 64 IN | DIASTOLIC BLOOD PRESSURE: 82 MMHG | HEART RATE: 73 BPM | SYSTOLIC BLOOD PRESSURE: 124 MMHG | RESPIRATION RATE: 16 BRPM

## 2024-03-12 DIAGNOSIS — F32.9 REACTIVE DEPRESSION: ICD-10-CM

## 2024-03-12 DIAGNOSIS — R20.0 NUMBNESS: Primary | ICD-10-CM

## 2024-03-12 LAB
ANION GAP SERPL CALCULATED.3IONS-SCNC: 9 MMOL/L (ref 7–15)
BUN SERPL-MCNC: 8.9 MG/DL (ref 6–20)
CALCIUM SERPL-MCNC: 9.6 MG/DL (ref 8.6–10)
CHLORIDE SERPL-SCNC: 103 MMOL/L (ref 98–107)
CREAT SERPL-MCNC: 0.69 MG/DL (ref 0.51–0.95)
DEPRECATED HCO3 PLAS-SCNC: 27 MMOL/L (ref 22–29)
EGFRCR SERPLBLD CKD-EPI 2021: >90 ML/MIN/1.73M2
ERYTHROCYTE [DISTWIDTH] IN BLOOD BY AUTOMATED COUNT: 12.7 % (ref 10–15)
GLUCOSE SERPL-MCNC: 98 MG/DL (ref 70–99)
HCT VFR BLD AUTO: 41.8 % (ref 35–47)
HGB BLD-MCNC: 13.6 G/DL (ref 11.7–15.7)
MCH RBC QN AUTO: 27.8 PG (ref 26.5–33)
MCHC RBC AUTO-ENTMCNC: 32.5 G/DL (ref 31.5–36.5)
MCV RBC AUTO: 86 FL (ref 78–100)
PLATELET # BLD AUTO: 358 10E3/UL (ref 150–450)
POTASSIUM SERPL-SCNC: 4.4 MMOL/L (ref 3.4–5.3)
RBC # BLD AUTO: 4.89 10E6/UL (ref 3.8–5.2)
SODIUM SERPL-SCNC: 139 MMOL/L (ref 135–145)
TSH SERPL DL<=0.005 MIU/L-ACNC: 1.38 UIU/ML (ref 0.3–4.2)
VIT B12 SERPL-MCNC: 413 PG/ML (ref 232–1245)
VIT D+METAB SERPL-MCNC: 20 NG/ML (ref 20–50)
WBC # BLD AUTO: 7.3 10E3/UL (ref 4–11)

## 2024-03-12 PROCEDURE — 82306 VITAMIN D 25 HYDROXY: CPT | Performed by: FAMILY MEDICINE

## 2024-03-12 PROCEDURE — 84443 ASSAY THYROID STIM HORMONE: CPT | Performed by: FAMILY MEDICINE

## 2024-03-12 PROCEDURE — 82607 VITAMIN B-12: CPT | Performed by: FAMILY MEDICINE

## 2024-03-12 PROCEDURE — 36415 COLL VENOUS BLD VENIPUNCTURE: CPT | Performed by: FAMILY MEDICINE

## 2024-03-12 PROCEDURE — 99214 OFFICE O/P EST MOD 30 MIN: CPT | Performed by: FAMILY MEDICINE

## 2024-03-12 PROCEDURE — 85027 COMPLETE CBC AUTOMATED: CPT | Performed by: FAMILY MEDICINE

## 2024-03-12 PROCEDURE — 80048 BASIC METABOLIC PNL TOTAL CA: CPT | Performed by: FAMILY MEDICINE

## 2024-03-12 NOTE — PROGRESS NOTES
Assessment/Plan:    Tammy Obregon is a 38 year old female presenting for:    Numbness  Suspect mild nerve compression.  Recommended ibuprofen 2-3 times daily with food for the next 5 to 7 days.  Could consider a course of prednisone.  If these are ineffective would consider spine imaging starting with an x-ray.  - Vitamin D Deficiency  - TSH with free T4 reflex  - CBC with Platelets  - BASIC METABOLIC PANEL  - Vitamin B12  - Vitamin D Deficiency  - TSH with free T4 reflex  - CBC with Platelets  - BASIC METABOLIC PANEL  - Vitamin B12    Reactive depression  Reactive depression after her  passing in December.  Would like to get her into see a therapist sometime in the next few weeks that she feels as though she is ready to talk with someone.  She has a good support system.  No suicidal or homicidal ideations.      Medications Discontinued During This Encounter   Medication Reason    topiramate (TOPAMAX) 25 MG tablet Therapy completed (No AVS)    loratadine (CLARITIN) 10 MG tablet Therapy completed (No AVS)    levonorgestrel-ethinyl estradiol (AVIANE) 0.1-20 MG-MCG tablet Therapy completed (No AVS)    SUMAtriptan (IMITREX) 50 MG tablet Therapy completed (No AVS)    Multiple Vitamins-Minerals (WOMENS MULTI VITAMIN & MINERAL) TABS Therapy completed (No AVS)    azelastine (OPTIVAR) 0.05 % ophthalmic solution Therapy completed (No AVS)    ciprofloxacin-dexAMETHasone (CIPRODEX) 0.3-0.1 % otic suspension Therapy completed (No AVS)           Chief Complaint:  Back Pain        Subjective:   Tammy Obregon is a very pleasant 38-year-old female who presents to the clinic today for concerns over numbness in her back.    Patient notes for the last 2 to 3 weeks she will have a spot on the left side of her mid back that becomes a bit numb.  It is not painful.  It will generally last a few minutes and then feel better.  She cannot relate it to position or activity.  She does not have any rash or skin changes.  When it  "is not numb it feels completely normal.    When she was walking yesterday she also noticed some very mild numbness in her left calf although this is not a common occurrence for her.    She is under a great deal of stress recently.  Her  passed away unexpectedly in late December.  She has 2 young children.  This is been understandably difficult for her and, although she feels as though she is coping well, she has been under a great deal of stress.  She has a good support system with her parents.    12 point review of systems completed and negative except for what has been described above    History   Smoking Status    Never   Smokeless Tobacco    Never       No current outpatient medications on file.      Objective:  Vitals:    03/12/24 1115   BP: 124/82   Pulse: 73   Resp: 16   Temp: 98.3  F (36.8  C)   TempSrc: Tympanic   SpO2: 100%   Weight: 82.2 kg (181 lb 3.2 oz)   Height: 1.632 m (5' 4.25\")       Body mass index is 30.86 kg/m .    Vital signs reviewed and stable  General: No acute distress  Psych: Appropriate affect  HEENT: moist mucous membranes, pupils equal, round, reactive to light and accomodation, tympanic membranes are pearly grey bilaterally  Lymph: no cervical or supraclavicular lymphadenopathy  Cardiovascular: regular rate and rhythm with no murmur  Pulmonary: clear to auscultation bilaterally with no wheeze  Abdomen: soft, non tender, non distended with normo-active bowel sounds  Extremities: warm and well perfused with no edema  Skin: warm and dry with no rash  Neurologic: Cranial nerves II through XII are intact, 5 out of 5 strength in upper and lower extremities.  No abnormalities of sensation currently either in her back or her calf       This note has been dictated and transcribed using voice recognition software.   Any errors in transcription are unintentional and inherent to the software.  Answers submitted by the patient for this visit:  Patient Health Questionnaire (Submitted on " 3/11/2024)  If you checked off any problems, how difficult have these problems made it for you to do your work, take care of things at home, or get along with other people?: Somewhat difficult  PHQ9 TOTAL SCORE: 14  General Questionnaire (Submitted on 3/11/2024)  Chief Complaint: Chronic problems general questions HPI Form  How many servings of fruits and vegetables do you eat daily?: 0-1  On average, how many sweetened beverages do you drink each day (Examples: soda, juice, sweet tea, etc.  Do NOT count diet or artificially sweetened beverages)?: 0  How many minutes a day do you exercise enough to make your heart beat faster?: 9 or less  How many days a week do you exercise enough to make your heart beat faster?: 3 or less  How many days per week do you miss taking your medication?: 0  General Concern (Submitted on 3/11/2024)  Chief Complaint: Chronic problems general questions HPI Form  What is the reason for your visit today?: Numb spot on back, comes and goes  When did your symptoms begin?: 1-2 weeks ago  What are your symptoms?: Numb spot on back  How would you describe these symptoms?: Moderate  Are your symptoms:: Staying the same  Have you had these symptoms before?: No

## 2024-07-20 ENCOUNTER — HEALTH MAINTENANCE LETTER (OUTPATIENT)
Age: 39
End: 2024-07-20

## 2024-09-24 ENCOUNTER — ANCILLARY PROCEDURE (OUTPATIENT)
Dept: GENERAL RADIOLOGY | Facility: CLINIC | Age: 39
End: 2024-09-24
Attending: FAMILY MEDICINE
Payer: OTHER GOVERNMENT

## 2024-09-24 ENCOUNTER — OFFICE VISIT (OUTPATIENT)
Dept: FAMILY MEDICINE | Facility: CLINIC | Age: 39
End: 2024-09-24
Payer: OTHER GOVERNMENT

## 2024-09-24 VITALS
TEMPERATURE: 98.5 F | HEART RATE: 99 BPM | DIASTOLIC BLOOD PRESSURE: 80 MMHG | HEIGHT: 64 IN | BODY MASS INDEX: 30.28 KG/M2 | WEIGHT: 177.38 LBS | RESPIRATION RATE: 16 BRPM | OXYGEN SATURATION: 98 % | SYSTOLIC BLOOD PRESSURE: 124 MMHG

## 2024-09-24 DIAGNOSIS — R20.0 ARM NUMBNESS: ICD-10-CM

## 2024-09-24 DIAGNOSIS — F32.9 REACTIVE DEPRESSION: ICD-10-CM

## 2024-09-24 DIAGNOSIS — Z00.00 ROUTINE GENERAL MEDICAL EXAMINATION AT A HEALTH CARE FACILITY: Primary | ICD-10-CM

## 2024-09-24 LAB
ALBUMIN SERPL BCG-MCNC: 4.6 G/DL (ref 3.5–5.2)
ALP SERPL-CCNC: 101 U/L (ref 40–150)
ALT SERPL W P-5'-P-CCNC: 9 U/L (ref 0–50)
ANION GAP SERPL CALCULATED.3IONS-SCNC: 11 MMOL/L (ref 7–15)
AST SERPL W P-5'-P-CCNC: 16 U/L (ref 0–45)
BILIRUB SERPL-MCNC: 0.5 MG/DL
BUN SERPL-MCNC: 9.5 MG/DL (ref 6–20)
CALCIUM SERPL-MCNC: 9.5 MG/DL (ref 8.8–10.4)
CHLORIDE SERPL-SCNC: 102 MMOL/L (ref 98–107)
CHOLEST SERPL-MCNC: 239 MG/DL
CREAT SERPL-MCNC: 0.71 MG/DL (ref 0.51–0.95)
EGFRCR SERPLBLD CKD-EPI 2021: >90 ML/MIN/1.73M2
ERYTHROCYTE [DISTWIDTH] IN BLOOD BY AUTOMATED COUNT: 12.4 % (ref 10–15)
FASTING STATUS PATIENT QL REPORTED: YES
FASTING STATUS PATIENT QL REPORTED: YES
GLUCOSE SERPL-MCNC: 95 MG/DL (ref 70–99)
HCO3 SERPL-SCNC: 26 MMOL/L (ref 22–29)
HCT VFR BLD AUTO: 37.8 % (ref 35–47)
HDLC SERPL-MCNC: 63 MG/DL
HGB BLD-MCNC: 12.8 G/DL (ref 11.7–15.7)
LDLC SERPL CALC-MCNC: 154 MG/DL
MCH RBC QN AUTO: 28.6 PG (ref 26.5–33)
MCHC RBC AUTO-ENTMCNC: 33.9 G/DL (ref 31.5–36.5)
MCV RBC AUTO: 84 FL (ref 78–100)
NONHDLC SERPL-MCNC: 176 MG/DL
PLATELET # BLD AUTO: 363 10E3/UL (ref 150–450)
POTASSIUM SERPL-SCNC: 3.8 MMOL/L (ref 3.4–5.3)
PROT SERPL-MCNC: 7.9 G/DL (ref 6.4–8.3)
RBC # BLD AUTO: 4.48 10E6/UL (ref 3.8–5.2)
SODIUM SERPL-SCNC: 139 MMOL/L (ref 135–145)
TRIGL SERPL-MCNC: 112 MG/DL
WBC # BLD AUTO: 10.2 10E3/UL (ref 4–11)

## 2024-09-24 PROCEDURE — 36415 COLL VENOUS BLD VENIPUNCTURE: CPT | Performed by: FAMILY MEDICINE

## 2024-09-24 PROCEDURE — 96127 BRIEF EMOTIONAL/BEHAV ASSMT: CPT | Performed by: FAMILY MEDICINE

## 2024-09-24 PROCEDURE — 99214 OFFICE O/P EST MOD 30 MIN: CPT | Mod: 25 | Performed by: FAMILY MEDICINE

## 2024-09-24 PROCEDURE — 80053 COMPREHEN METABOLIC PANEL: CPT | Performed by: FAMILY MEDICINE

## 2024-09-24 PROCEDURE — 99395 PREV VISIT EST AGE 18-39: CPT | Mod: 25 | Performed by: FAMILY MEDICINE

## 2024-09-24 PROCEDURE — 85027 COMPLETE CBC AUTOMATED: CPT | Performed by: FAMILY MEDICINE

## 2024-09-24 PROCEDURE — 72070 X-RAY EXAM THORAC SPINE 2VWS: CPT | Mod: TC | Performed by: RADIOLOGY

## 2024-09-24 PROCEDURE — 90471 IMMUNIZATION ADMIN: CPT | Performed by: FAMILY MEDICINE

## 2024-09-24 PROCEDURE — 72040 X-RAY EXAM NECK SPINE 2-3 VW: CPT | Mod: TC | Performed by: RADIOLOGY

## 2024-09-24 PROCEDURE — 80061 LIPID PANEL: CPT | Performed by: FAMILY MEDICINE

## 2024-09-24 PROCEDURE — 90656 IIV3 VACC NO PRSV 0.5 ML IM: CPT | Performed by: FAMILY MEDICINE

## 2024-09-24 ASSESSMENT — PATIENT HEALTH QUESTIONNAIRE - PHQ9
SUM OF ALL RESPONSES TO PHQ QUESTIONS 1-9: 12
SUM OF ALL RESPONSES TO PHQ QUESTIONS 1-9: 12
10. IF YOU CHECKED OFF ANY PROBLEMS, HOW DIFFICULT HAVE THESE PROBLEMS MADE IT FOR YOU TO DO YOUR WORK, TAKE CARE OF THINGS AT HOME, OR GET ALONG WITH OTHER PEOPLE: SOMEWHAT DIFFICULT

## 2024-09-24 NOTE — PROGRESS NOTES
"Preventive Care Visit  [facility] CLARISSA Arceo MD, Family Medicine  Sep 24, 2024      Assessment & Plan     Routine general medical examination at a health care facility  - Lipid panel reflex to direct LDL Fasting; Future  - COMPREHENSIVE METABOLIC PANEL; Future  - CBC with Platelets; Future  - Lipid panel reflex to direct LDL Fasting  - COMPREHENSIVE METABOLIC PANEL  - CBC with Platelets    Reactive depression  Referral to a therapist was placed.  Zoloft sent to the pharmacy at a low dose.  Discussed risks and benefits of the medication.  Certainly empathized with her concerns of depression and irritability given her recent situation.    Discussed that this will likely take 3 to 4 weeks to become effective.  She will let me know if she needs to increase the dose.  She does have a good support system  - sertraline (ZOLOFT) 50 MG tablet; Take 0.5 tablets (25 mg) by mouth daily for 10 days, THEN 1 tablet (50 mg) daily.  - Adult Mental Health  Referral; Future    Arm numbness  Seems to be cubital tunnel.  Physical therapy exercises recommended.  X-ray of cervical and thoracic spine done today to ensure no severe arthritis or scoliosis.  X-rays were unremarkable.    She will let me know if she would like to see a physical therapist.  Also some thoracic back numbness which I suspect is likely due to tight muscles or pinched nerve.  Again, physical therapy or chiropractor might be of benefit.  - XR Cervical Spine 2/3 Views; Future  - XR Thoracic Spine 2 Views; Future    Patient has been advised of split billing requirements and indicates understanding: Yes        BMI  Estimated body mass index is 30.66 kg/m  as calculated from the following:    Height as of this encounter: 1.62 m (5' 3.78\").    Weight as of this encounter: 80.5 kg (177 lb 6 oz).   Weight management plan: Discussed healthy diet and exercise guidelines    Depression Screening Follow Up        9/24/2024     3:06 PM "   PHQ   PHQ-9 Total Score 12   Q9: Thoughts of better off dead/self-harm past 2 weeks Not at all         9/24/2024     3:06 PM   Last PHQ-9   1.  Little interest or pleasure in doing things 2   2.  Feeling down, depressed, or hopeless 2   3.  Trouble falling or staying asleep, or sleeping too much 1   4.  Feeling tired or having little energy 3   5.  Poor appetite or overeating 1   6.  Feeling bad about yourself 1   7.  Trouble concentrating 1   8.  Moving slowly or restless 1   Q9: Thoughts of better off dead/self-harm past 2 weeks 0   PHQ-9 Total Score 12         Follow Up Actions Taken  Crisis resource information provided in After Visit Summary     Counseling  Appropriate preventive services were addressed with this patient via screening, questionnaire, or discussion as appropriate for fall prevention, nutrition, physical activity, Tobacco-use cessation, social engagement, weight loss and cognition.  Checklist reviewing preventive services available has been given to the patient.  Reviewed patient's diet, addressing concerns and/or questions.   She is at risk for lack of exercise and has been provided with information to increase physical activity for the benefit of her well-being.   The patient's PHQ-9 score is consistent with moderate depression. She was provided with information regarding depression.           Abner Moreno is a 39 year old, presenting for the following:  Physical (Fasting for labs today/Numb spot on her back- mid upper L side since Feb- comes and goes but happens daily)       Health Care Directive  Patient does not have a Health Care Directive or Living Will: Discussed advance care planning with patient; however, patient declined at this time.    HPI  She has unfortunately had a very difficult time recently.      Her  passed away of the aortic dissection a little bit less than a year ago.  She has 2 young boys who are being evaluated by pediatric cardiology.  Being a single  parent has been very stressful for her.  She does have family in the area.    She finds that she has been a bit more irritable recently.  She has not talked with anyone regarding this situation.    She also notes that often while she is working or sometimes cooking she will have some numbness in her hands mostly in her fourth and fifth digits.  Sometimes she wakes up with this as well.  Additionally, she will notice some numbness in her left upper back that comes and goes throughout the day.  No pain.  No rash.  This has been going on for several months.            9/22/2024   General Health   How would you rate your overall physical health? Good   Feel stress (tense, anxious, or unable to sleep) Very much      (!) STRESS CONCERN      9/22/2024   Nutrition   Three or more servings of calcium each day? Yes   Diet: Regular (no restrictions)   How many servings of fruit and vegetables per day? (!) 0-1   How many sweetened beverages each day? 0-1            9/22/2024   Exercise   Days per week of moderate/strenous exercise 1 day   Average minutes spent exercising at this level 20 min      (!) EXERCISE CONCERN      9/22/2024   Social Factors   Frequency of gathering with friends or relatives Once a week   Worry food won't last until get money to buy more No   Food not last or not have enough money for food? No   Do you have housing? (Housing is defined as stable permanent housing and does not include staying ouside in a car, in a tent, in an abandoned building, in an overnight shelter, or couch-surfing.) Yes   Are you worried about losing your housing? No   Lack of transportation? No   Unable to get utilities (heat,electricity)? No            9/22/2024   Dental   Dentist two times every year? Yes            9/22/2024   TB Screening   Were you born outside of the [place]? No          Today's PHQ-9 Score:       9/24/2024     3:06 PM   PHQ-9 SCORE   PHQ-9 Total Score MyChart 12 (Moderate depression)   PHQ-9 Total Score 12          2024   Substance Use   Alcohol more than 3/day or more than 7/wk No   Do you use any other substances recreationally? No        Social History     Tobacco Use    Smoking status: Never    Smokeless tobacco: Never   Vaping Use    Vaping status: Never Used           2023   LAST FHS-7 RESULTS   1st degree relative breast or ovarian cancer No   Any relative bilateral breast cancer Unknown   Any male have breast cancer No   Any ONE woman have BOTH breast AND ovarian cancer No   Any woman with breast cancer before 50yrs Yes   2 or more relatives with breast AND/OR ovarian cancer No   2 or more relatives with breast AND/OR bowel cancer Yes           Mammogram Screening - Patient under 40 years of age: Routine Mammogram Screening not recommended.         2024   STI Screening   New sexual partner(s) since last STI/HIV test? No        History of abnormal Pap smear: No - age 30- 64 PAP with HPV every 5 years recommended        Latest Ref Rng & Units 2022     7:18 AM 2019     5:13 PM 2019    12:00 AM   PAP / HPV   PAP  Negative for Intraepithelial Lesion or Malignancy (NILM)  Atypical squamous cells of undetermined significance  Electronically signed by Franck Nuno MD on 2019 at  6:01 PM       HPV 16 DNA Negative Negative  Negative     HPV 18 DNA Negative Negative  Negative     Other HR HPV Negative Negative  Negative     PAP-ABSTRACT    See Scanned Document           This result is from an external source.           2024   Contraception/Family Planning   Questions about contraception or family planning No           Reviewed and updated as needed this visit by Provider                    Past Medical History:   Diagnosis Date    Migraine      Past Surgical History:   Procedure Laterality Date     SECTION N/A 2020    Procedure:  SECTION;  Surgeon: So Cross MD;  Location: [facility];  Service: Obstetrics    OTHER SURGICAL HISTORY      dental  "surgery         Review of Systems  Constitutional, HEENT, cardiovascular, pulmonary, GI, , musculoskeletal, neuro, skin, endocrine and psych systems are negative, except as otherwise noted.     Objective    Exam  /80   Pulse 99   Temp 98.5  F (36.9  C) (Tympanic)   Resp 16   Ht 1.62 m (5' 3.78\")   Wt 80.5 kg (177 lb 6 oz)   LMP 09/16/2024 (Exact Date)   SpO2 98%   BMI 30.66 kg/m     Estimated body mass index is 30.66 kg/m  as calculated from the following:    Height as of this encounter: 1.62 m (5' 3.78\").    Weight as of this encounter: 80.5 kg (177 lb 6 oz).    Physical Exam    Physical Exam:  General Appearance: Alert, cooperative, no distress, appears stated age   Head: Normocephalic, without obvious abnormality, atraumatic  Eyes: PERRL, conjunctiva/corneas clear, EOM's intact   Ears: Normal TM's and external ear canals, both ears  Nose:Nares normal, septum midline,mucosa normal, no drainage    Throat:Lips, mucosa, and tongue normal; teeth and gums normal  Neck: Supple, symmetrical, trachea midline, no adenopathy;  thyroid: not enlarged, symmetric, no tenderness/mass/nodules  Back: Symmetric, no curvature, ROM normal,  Lungs: Clear to auscultation bilaterally, respirations unlabored  Breasts: No breast masses, tenderness, asymmetry, or nipple discharge.  Heart: Regular rate and rhythm, S1 and S2 normal, no murmur, rub, or gallop  Abdomen: Soft, non-tender, bowel sounds active all four quadrants,  no masses, no organomegaly  Extremities: Extremities normal, atraumatic, no cyanosis or edema  Skin: Skin color, texture, turgor normal, no rashes or lesions  Lymph nodes: Cervical, supraclavicular, and axillary nodes normal and   Neurologic: Normal, patient does note some mild numbness in a spot on her left thoracic back, normal range of motion in arms with no numbness in fingers and normal strength.          Signed Electronically by: Skylar Arceo MD    "

## 2024-09-24 NOTE — PATIENT INSTRUCTIONS
Patient Education   Ulnar Neuropathy (Handlebar Palsy): Exercises  Introduction  Here are some examples of exercises for you to try. The exercises may be suggested for a condition or for rehabilitation. Start each exercise slowly. Ease off the exercises if you start to have pain.  You will be told when to start these exercises and which ones will work best for you.  How to do the exercises  Neck rotation    Sit up straight in a firm chair, or stand up straight. If you're standing, keep your feet about hip-width apart.  Keeping your chin level, turn your head to the right and hold for 15 to 30 seconds.  Turn your head to the left and hold for 15 to 30 seconds.  Repeat 2 to 4 times.  Shoulder-blade squeeze    Sit or stand up straight with your arms at your sides.  Keep your shoulders relaxed and down, not shrugged.  Squeeze your shoulder blades down and together.  Hold for about 6 seconds, then relax.  Repeat 8 to 12 times.  Neck stretch to the side (upper trap stretch)    Sit in a firm chair, or stand up straight. Keep your shoulder down as you lean away from it. To help you remember to do this, start by relaxing your shoulders and lightly holding on to your thighs or your chair.  Look straight ahead. Tilt your head toward one shoulder and hold for 15 to 30 seconds. Relax and let the weight of your head stretch your muscles.  Slowly return your head to the starting position.  Repeat 2 to 4 times toward each shoulder.  If you would like a little added stretch, place your arm behind your back. Use the arm opposite of the direction you are tilting your head. For example, if you are tilting your head to the left, place your right arm behind your back.  You can also add more stretch by using one hand to pull your head toward your shoulder. For example, keeping your right shoulder down, lean your head to the left and use your left hand to gently and steadily pull your head toward your shoulder.  Elbow flexion and  extension    Stand with your arms relaxed at your sides.  With your affected arm, gently bend your elbow up toward you as far as possible. Your palm should face up.  Then straighten your arm as much as you can.  Repeat 2 to 4 times.  It's a good idea to repeat these steps with your other arm.  Wrist flexor stretch    Extend your affected arm in front of you with your palm facing down.  Bend back your wrist on your affected arm, pointing your fingers up.  With your other hand, gently bend your wrist farther until you feel a mild to medium stretch in your forearm.  Hold for at least 15 to 30 seconds.  Repeat 2 to 4 times.  Repeat steps 1 through 5. But this time extend your affected arm in front of you with your palm facing up. Then bend back your wrist, pointing your fingers down.  It's a good idea to repeat these steps with your other arm.  Wrist flexion and extension    Place your forearm on a table. Your affected hand and wrist should extend beyond the table, palm down.  Bend your wrist to move your hand upward and allow your hand to close into a fist. Hold for about 6 seconds.  Now lower your hand as far as you can and allow your fingers to straighten. Hold for about 6 seconds.  Repeat both directions 8 to 12 times.  It's a good idea to repeat these steps with your other hand.  Follow-up care is a key part of your treatment and safety. Be sure to make and go to all appointments, and call your doctor if you are having problems. It's also a good idea to know your test results and keep a list of the medicines you take.  Current as of: July 17, 2023               Content Version: 14.0    4173-0379 [employer].   Care instructions adapted under license by your healthcare professional. If you have questions about a medical condition or this instruction, always ask your healthcare professional. [employer] disclaims any warranty or liability for your use of this information.           Patient  Education   Preventive Care Advice   This is general advice given by our system to help you stay healthy. However, your care team may have specific advice just for you. Please talk to your care team about your preventive care needs.  Nutrition  Eat 5 or more servings of fruits and vegetables each day.  Try wheat bread, brown rice and whole grain pasta (instead of white bread, rice, and pasta).  Get enough calcium and vitamin D. Check the label on foods and aim for 100% of the RDA (recommended daily allowance).  Lifestyle  Exercise at least 150 minutes each week  (30 minutes a day, 5 days a week).  Do muscle strengthening activities 2 days a week. These help control your weight and prevent disease.  No smoking.  Wear sunscreen to prevent skin cancer.  Have a dental exam and cleaning every 6 months.  Yearly exams  See your health care team every year to talk about:  Any changes in your health.  Any medicines your care team has prescribed.  Preventive care, family planning, and ways to prevent chronic diseases.  Shots (vaccines)   HPV shots (up to age 26), if you've never had them before.  Hepatitis B shots (up to age 59), if you've never had them before.  COVID-19 shot: Get this shot when it's due.  Flu shot: Get a flu shot every year.  Tetanus shot: Get a tetanus shot every 10 years.  Pneumococcal, hepatitis A, and RSV shots: Ask your care team if you need these based on your risk.  Shingles shot (for age 50 and up)  General health tests  Diabetes screening:  Starting at age 35, Get screened for diabetes at least every 3 years.  If you are younger than age 35, ask your care team if you should be screened for diabetes.  Cholesterol test: At age 39, start having a cholesterol test every 5 years, or more often if advised.  Bone density scan (DEXA): At age 50, ask your care team if you should have this scan for osteoporosis (brittle bones).  Hepatitis C: Get tested at least once in your life.  STIs (sexually transmitted  infections)  Before age 24: Ask your care team if you should be screened for STIs.  After age 24: Get screened for STIs if you're at risk. You are at risk for STIs (including HIV) if:  You are sexually active with more than one person.  You don't use condoms every time.  You or a partner was diagnosed with a sexually transmitted infection.  If you are at risk for HIV, ask about PrEP medicine to prevent HIV.  Get tested for HIV at least once in your life, whether you are at risk for HIV or not.  Cancer screening tests  Cervical cancer screening: If you have a cervix, begin getting regular cervical cancer screening tests starting at age 21.  Breast cancer scan (mammogram): If you've ever had breasts, begin having regular mammograms starting at age 40. This is a scan to check for breast cancer.  Colon cancer screening: It is important to start screening for colon cancer at age 45.  Have a colonoscopy test every 10 years (or more often if you're at risk) Or, ask your provider about stool tests like a FIT test every year or Cologuard test every 3 years.  To learn more about your testing options, visit:   .  For help making a decision, visit:   https://bit.ly/pm93359.  Prostate cancer screening test: If you have a prostate, ask your care team if a prostate cancer screening test (PSA) at age 55 is right for you.  Lung cancer screening: If you are a current or former smoker ages 50 to 80, ask your care team if ongoing lung cancer screenings are right for you.  For informational purposes only. Not to replace the advice of your health care provider. Copyright   2023 [facility]. All rights reserved. Clinically reviewed by the [facility]itions [facility]. [employer] 390789 - REV 01/24.  Learning About Stress  What is stress?     Stress is your body's response to a hard situation. Your body can have a physical, emotional, or mental response. Stress is a fact of life for most people, and it affects  everyone differently. What causes stress for you may not be stressful for someone else.  A lot of things can cause stress. You may feel stress when you go on a job interview, take a test, or run a race. This kind of short-term stress is normal and even useful. It can help you if you need to work hard or react quickly. For example, stress can help you finish an important job on time.  Long-term stress is caused by ongoing stressful situations or events. Examples of long-term stress include long-term health problems, ongoing problems at work, or conflicts in your family. Long-term stress can harm your health.  How does stress affect your health?  When you are stressed, your body responds as though you are in danger. It makes hormones that speed up your heart, make you breathe faster, and give you a burst of energy. This is called the fight-or-flight stress response. If the stress is over quickly, your body goes back to normal and no harm is done.  But if stress happens too often or lasts too long, it can have bad effects. Long-term stress can make you more likely to get sick, and it can make symptoms of some diseases worse. If you tense up when you are stressed, you may develop neck, shoulder, or low back pain. Stress is linked to high blood pressure and heart disease.  Stress also harms your emotional health. It can make you boggs, tense, or depressed. Your relationships may suffer, and you may not do well at work or school.  What can you do to manage stress?  You can try these things to help manage stress:   Do something active. Exercise or activity can help reduce stress. Walking is a great way to get started. Even everyday activities such as housecleaning or yard work can help.  Try yoga or carlos chi. These techniques combine exercise and meditation. You may need some training at first to learn them.  Do something you enjoy. For example, listen to music or go to a movie. Practice your hobby or do volunteer  "work.  Meditate. This can help you relax, because you are not worrying about what happened before or what may happen in the future.  Do guided imagery. Imagine yourself in any setting that helps you feel calm. You can use online videos, books, or a teacher to guide you.  Do breathing exercises. For example:  From a standing position, bend forward from the waist with your knees slightly bent. Let your arms dangle close to the floor.  Breathe in slowly and deeply as you return to a standing position. Roll up slowly and lift your head last.  Hold your breath for just a few seconds in the standing position.  Breathe out slowly and bend forward from the waist.  Let your feelings out. Talk, laugh, cry, and express anger when you need to. Talking with supportive friends or family, a counselor, or a renny leader about your feelings is a healthy way to relieve stress. Avoid discussing your feelings with people who make you feel worse.  Write. It may help to write about things that are bothering you. This helps you find out how much stress you feel and what is causing it. When you know this, you can find better ways to cope.  What can you do to prevent stress?  You might try some of these things to help prevent stress:  Manage your time. This helps you find time to do the things you want and need to do.  Get enough sleep. Your body recovers from the stresses of the day while you are sleeping.  Get support. Your family, friends, and community can make a difference in how you experience stress.  Limit your news feed. Avoid or limit time on social media or news that may make you feel stressed.  Do something active. Exercise or activity can help reduce stress. Walking is a great way to get started.  Where can you learn more?  Go to https://www.healthwise.net/patiented  Enter N032 in the search box to learn more about \"Learning About Stress.\"  Current as of: October 24, 2023               Content Version: 14.0    1147-6717 " [employer].   Care instructions adapted under license by your healthcare professional. If you have questions about a medical condition or this instruction, always ask your healthcare professional. [employer] disclaims any warranty or liability for your use of this information.      Learning About Depression Screening  What is depression screening?  Depression screening is a way to see if you have depression symptoms. It may be done by a doctor or counselor. It's often part of a routine checkup. That's because your mental health is just as important as your physical health.  Depression is a mental health condition that affects how you feel, think, and act. You may:  Have less energy.  Lose interest in your daily activities.  Feel sad and grouchy for a long time.  Depression is very common. It affects people of all ages.  Many things can lead to depression. Some people become depressed after they have a stroke or find out they have a major illness like cancer or heart disease. The death of a loved one or a breakup may lead to depression. It can run in families. Most experts believe that a combination of inherited genes and stressful life events can cause it.  What happens during screening?  You may be asked to fill out a form about your depression symptoms. You and the doctor will discuss your answers. The doctor may ask you more questions to learn more about how you think, act, and feel.  What happens after screening?  If you have symptoms of depression, your doctor will talk to you about your options.  Doctors usually treat depression with medicines or counseling. Often, combining the two works best. Many people don't get help because they think that they'll get over the depression on their own. But people with depression may not get better unless they get treatment.  The cause of depression is not well understood. There may be many factors involved. But if you have depression, it's not  "your fault.  A serious symptom of depression is thinking about death or suicide. If you or someone you care about talks about this or about feeling hopeless, get help right away.  It's important to know that depression can be treated. Medicine, counseling, and self-care may help.  Where can you learn more?  Go to https://www.[employer].net/patiented  Enter T185 in the search box to learn more about \"Learning About Depression Screening.\"  Current as of: June 24, 2023  Content Version: 14.1 2006-2024 [email or web address].   Care instructions adapted under license by your healthcare professional. If you have questions about a medical condition or this instruction, always ask your healthcare professional. [email or web address] disclaims any warranty or liability for your use of this information.       "

## 2024-12-24 ENCOUNTER — HOSPITAL ENCOUNTER (EMERGENCY)
Facility: CLINIC | Age: 39
Discharge: HOME | End: 2024-12-25
Attending: STUDENT IN AN ORGANIZED HEALTH CARE EDUCATION/TRAINING PROGRAM
Payer: OTHER GOVERNMENT

## 2024-12-24 DIAGNOSIS — K80.20 SYMPTOMATIC CHOLELITHIASIS: ICD-10-CM

## 2024-12-24 LAB
ALBUMIN SERPL BCG-MCNC: 4.6 G/DL (ref 3.5–5.2)
ALP SERPL-CCNC: 117 U/L (ref 40–150)
ALT SERPL W P-5'-P-CCNC: 63 U/L (ref 0–50)
ANION GAP SERPL CALCULATED.3IONS-SCNC: 12 MMOL/L (ref 7–15)
AST SERPL W P-5'-P-CCNC: 114 U/L (ref 0–45)
BASOPHILS # BLD AUTO: 0.1 10E3/UL (ref 0–0.2)
BASOPHILS NFR BLD AUTO: 0 %
BILIRUB DIRECT SERPL-MCNC: <0.2 MG/DL (ref 0–0.3)
BILIRUB SERPL-MCNC: 0.3 MG/DL
BUN SERPL-MCNC: 9.6 MG/DL (ref 6–20)
CALCIUM SERPL-MCNC: 10.5 MG/DL (ref 8.8–10.4)
CHLORIDE SERPL-SCNC: 98 MMOL/L (ref 98–107)
CREAT SERPL-MCNC: 0.61 MG/DL (ref 0.51–0.95)
EGFRCR SERPLBLD CKD-EPI 2021: >90 ML/MIN/1.73M2
EOSINOPHIL # BLD AUTO: 0 10E3/UL (ref 0–0.7)
EOSINOPHIL NFR BLD AUTO: 0 %
ERYTHROCYTE [DISTWIDTH] IN BLOOD BY AUTOMATED COUNT: 12.5 % (ref 10–15)
GLUCOSE SERPL-MCNC: 188 MG/DL (ref 70–99)
HCG SERPL QL: NEGATIVE
HCO3 SERPL-SCNC: 25 MMOL/L (ref 22–29)
HCT VFR BLD AUTO: 37.2 % (ref 35–47)
HGB BLD-MCNC: 12.5 G/DL (ref 11.7–15.7)
IMM GRANULOCYTES # BLD: 0.1 10E3/UL
IMM GRANULOCYTES NFR BLD: 0 %
LIPASE SERPL-CCNC: 27 U/L (ref 13–60)
LYMPHOCYTES # BLD AUTO: 2 10E3/UL (ref 0.8–5.3)
LYMPHOCYTES NFR BLD AUTO: 13 %
MCH RBC QN AUTO: 28.5 PG (ref 26.5–33)
MCHC RBC AUTO-ENTMCNC: 33.6 G/DL (ref 31.5–36.5)
MCV RBC AUTO: 85 FL (ref 78–100)
MONOCYTES # BLD AUTO: 0.7 10E3/UL (ref 0–1.3)
MONOCYTES NFR BLD AUTO: 5 %
NEUTROPHILS # BLD AUTO: 12.8 10E3/UL (ref 1.6–8.3)
NEUTROPHILS NFR BLD AUTO: 82 %
NRBC # BLD AUTO: 0 10E3/UL
NRBC BLD AUTO-RTO: 0 /100
PLATELET # BLD AUTO: 339 10E3/UL (ref 150–450)
POTASSIUM SERPL-SCNC: 4.1 MMOL/L (ref 3.4–5.3)
PROT SERPL-MCNC: 7.7 G/DL (ref 6.4–8.3)
RBC # BLD AUTO: 4.39 10E6/UL (ref 3.8–5.2)
SODIUM SERPL-SCNC: 135 MMOL/L (ref 135–145)
WBC # BLD AUTO: 15.7 10E3/UL (ref 4–11)

## 2024-12-24 PROCEDURE — 84703 CHORIONIC GONADOTROPIN ASSAY: CPT | Performed by: STUDENT IN AN ORGANIZED HEALTH CARE EDUCATION/TRAINING PROGRAM

## 2024-12-24 PROCEDURE — 85025 COMPLETE CBC W/AUTO DIFF WBC: CPT | Performed by: STUDENT IN AN ORGANIZED HEALTH CARE EDUCATION/TRAINING PROGRAM

## 2024-12-24 PROCEDURE — 99285 EMERGENCY DEPT VISIT HI MDM: CPT | Mod: 25 | Performed by: STUDENT IN AN ORGANIZED HEALTH CARE EDUCATION/TRAINING PROGRAM

## 2024-12-24 PROCEDURE — 96374 THER/PROPH/DIAG INJ IV PUSH: CPT | Performed by: STUDENT IN AN ORGANIZED HEALTH CARE EDUCATION/TRAINING PROGRAM

## 2024-12-24 PROCEDURE — 36415 COLL VENOUS BLD VENIPUNCTURE: CPT | Performed by: STUDENT IN AN ORGANIZED HEALTH CARE EDUCATION/TRAINING PROGRAM

## 2024-12-24 PROCEDURE — 99284 EMERGENCY DEPT VISIT MOD MDM: CPT | Performed by: STUDENT IN AN ORGANIZED HEALTH CARE EDUCATION/TRAINING PROGRAM

## 2024-12-24 PROCEDURE — 250N000011 HC RX IP 250 OP 636: Performed by: STUDENT IN AN ORGANIZED HEALTH CARE EDUCATION/TRAINING PROGRAM

## 2024-12-24 PROCEDURE — 82248 BILIRUBIN DIRECT: CPT | Performed by: STUDENT IN AN ORGANIZED HEALTH CARE EDUCATION/TRAINING PROGRAM

## 2024-12-24 PROCEDURE — 83690 ASSAY OF LIPASE: CPT | Performed by: STUDENT IN AN ORGANIZED HEALTH CARE EDUCATION/TRAINING PROGRAM

## 2024-12-24 PROCEDURE — 80048 BASIC METABOLIC PNL TOTAL CA: CPT | Performed by: STUDENT IN AN ORGANIZED HEALTH CARE EDUCATION/TRAINING PROGRAM

## 2024-12-24 RX ORDER — KETOROLAC TROMETHAMINE 15 MG/ML
15 INJECTION, SOLUTION INTRAMUSCULAR; INTRAVENOUS ONCE
Status: COMPLETED | OUTPATIENT
Start: 2024-12-24 | End: 2024-12-24

## 2024-12-24 RX ADMIN — KETOROLAC TROMETHAMINE 15 MG: 15 INJECTION, SOLUTION INTRAMUSCULAR; INTRAVENOUS at 23:58

## 2024-12-24 ASSESSMENT — COLUMBIA-SUICIDE SEVERITY RATING SCALE - C-SSRS
1. IN THE PAST MONTH, HAVE YOU WISHED YOU WERE DEAD OR WISHED YOU COULD GO TO SLEEP AND NOT WAKE UP?: NO
6. HAVE YOU EVER DONE ANYTHING, STARTED TO DO ANYTHING, OR PREPARED TO DO ANYTHING TO END YOUR LIFE?: NO
2. HAVE YOU ACTUALLY HAD ANY THOUGHTS OF KILLING YOURSELF IN THE PAST MONTH?: NO

## 2024-12-25 ENCOUNTER — APPOINTMENT (OUTPATIENT)
Dept: ULTRASOUND IMAGING | Facility: CLINIC | Age: 39
End: 2024-12-25
Attending: STUDENT IN AN ORGANIZED HEALTH CARE EDUCATION/TRAINING PROGRAM
Payer: OTHER GOVERNMENT

## 2024-12-25 VITALS
WEIGHT: 183.86 LBS | RESPIRATION RATE: 16 BRPM | DIASTOLIC BLOOD PRESSURE: 73 MMHG | HEIGHT: 64 IN | SYSTOLIC BLOOD PRESSURE: 117 MMHG | BODY MASS INDEX: 31.39 KG/M2 | HEART RATE: 114 BPM | OXYGEN SATURATION: 98 % | TEMPERATURE: 97.7 F

## 2024-12-25 PROCEDURE — 76705 ECHO EXAM OF ABDOMEN: CPT

## 2024-12-25 ASSESSMENT — ACTIVITIES OF DAILY LIVING (ADL): ADLS_ACUITY_SCORE: 41

## 2024-12-25 NOTE — ED PROVIDER NOTES
History     Chief Complaint   Patient presents with    Abdominal Pain     HPI  Tammy Obregon is a 39 year old female who has hyperlipidemia, who presents to the emergency department for evaluation of abdominal pain.  Abdominal surgical history notable for  in .  Patient states that she developed right upper quadrant abdominal pain about 2 hours ago.  She states that the pain comes in waves of severe pain and then is a constant dull ache.  She has some nausea during the episodes, but no nausea now.  She states this happened a couple of hours after eating dinner where she had cheese, summer sausage and crackers.  She states she has had issues where she gets abdominal pain and nausea after eating, but it always goes away in about 20 minutes and has never been this bad or persistent.  She reports having normal bowel movement earlier today.  She denies chest pain or trouble breathing.  No fevers.  No abnormal vaginal bleeding or discharge.  No urinary symptoms.  She has not had any medications for her symptoms.  No alcohol or drug use.    Allergies:  No Known Allergies    Problem List:    Patient Active Problem List    Diagnosis Date Noted    Vitamin B12 Deficiency      Priority: Medium     Created by Conversion        Hyperlipidemia      Priority: Medium     Created by Conversion        Obesity      Priority: Medium     Created by Conversion        Anxiety      Priority: Medium     Created by Conversion  Replacement Utility updated for latest IMO load        Allergic Rhinitis      Priority: Medium     Created by Conversion  Replacement Utility updated for latest IMO load            Past Medical History:    Past Medical History:   Diagnosis Date    Migraine        Past Surgical History:    Past Surgical History:   Procedure Laterality Date     SECTION N/A 2020    Procedure:  SECTION;  Surgeon: So Cross MD;  [facility];  Service: Obstetrics    OTHER  "SURGICAL HISTORY      dental surgery       Family History:    Family History   Problem Relation Age of Onset    Hyperlipidemia Father     Hearing Loss Maternal Grandfather     No Known Problems Paternal Grandmother     Hearing Loss Paternal Grandfather     Breast Cancer Maternal Aunt 40.00       Social History:  Marital Status:   [5]  Social History     Tobacco Use    Smoking status: Never    Smokeless tobacco: Never   Vaping Use    Vaping status: Never Used        Medications:    sertraline (ZOLOFT) 50 MG tablet          Review of Systems  See HPI  Physical Exam   BP: 130/87  Pulse: 85  Temp: 97.7  F (36.5  C)  Resp: 16  Height: 162.6 cm (5' 4\")  Weight: 83.4 kg (183 lb 13.8 oz)  SpO2: 99 %      Physical Exam  /73   Pulse 114   Temp 97.7  F (36.5  C) (Oral)   Resp 16   Ht 1.626 m (5' 4\")   Wt 83.4 kg (183 lb 13.8 oz)   LMP 12/11/2024   SpO2 98%   BMI 31.56 kg/m    General: alert, interactive, in no apparent distress  Head: atraumatic  Nose: no rhinorrhea or epistaxis  Ears: no external auditory canal discharge or bleeding.    Eyes: Sclera nonicteric. Conjunctiva noninjected. PERRL, EOMI  Mouth: no tonsillar erythema, edema, or exudate.  Moist mucous membranes  Neck: supple, moving spontaneously no midline cervical tenderness  Lungs: No increased work of breathing.  Clear to auscultation bilaterally.  CV: RRR, peripheral pulses palpable and symmetric  Abdomen: soft, palpation in the right upper quadrant.  There is mild guarding.  No rebound or rigidity.  Extremities: Warm and well-perfused.   Skin: no rash or diaphoresis  Neuro: CN II-XII grossly intact, following commands, moving all extremities    ED Course        Procedures             Critical Care time:  none             Results for orders placed or performed during the hospital encounter of 12/24/24 (from the past 24 hours)   CBC with platelets, differential    Narrative    The following orders were created for panel order CBC with " platelets, differential.  Procedure                               Abnormality         Status                     ---------                               -----------         ------                     CBC with platelets and d...[[id number]]  Abnormal            Final result                 Please view results for these tests on the individual orders.   Basic metabolic panel   Result Value Ref Range    Sodium 135 135 - 145 mmol/L    Potassium 4.1 3.4 - 5.3 mmol/L    Chloride 98 98 - 107 mmol/L    Carbon Dioxide (CO2) 25 22 - 29 mmol/L    Anion Gap 12 7 - 15 mmol/L    Urea Nitrogen 9.6 6.0 - 20.0 mg/dL    Creatinine 0.61 0.51 - 0.95 mg/dL    GFR Estimate >90 >60 mL/min/1.73m2    Calcium 10.5 (H) 8.8 - 10.4 mg/dL    Glucose 188 (H) 70 - 99 mg/dL   CBC with platelets and differential   Result Value Ref Range    WBC Count 15.7 (H) 4.0 - 11.0 10e3/uL    RBC Count 4.39 3.80 - 5.20 10e6/uL    Hemoglobin 12.5 11.7 - 15.7 g/dL    Hematocrit 37.2 35.0 - 47.0 %    MCV 85 78 - 100 fL    MCH 28.5 26.5 - 33.0 pg    MCHC 33.6 31.5 - 36.5 g/dL    RDW 12.5 10.0 - 15.0 %    Platelet Count 339 150 - 450 10e3/uL    % Neutrophils 82 %    % Lymphocytes 13 %    % Monocytes 5 %    % Eosinophils 0 %    % Basophils 0 %    % Immature Granulocytes 0 %    NRBCs per 100 WBC 0 <1 /100    Absolute Neutrophils 12.8 (H) 1.6 - 8.3 10e3/uL    Absolute Lymphocytes 2.0 0.8 - 5.3 10e3/uL    Absolute Monocytes 0.7 0.0 - 1.3 10e3/uL    Absolute Eosinophils 0.0 0.0 - 0.7 10e3/uL    Absolute Basophils 0.1 0.0 - 0.2 10e3/uL    Absolute Immature Granulocytes 0.1 <=0.4 10e3/uL    Absolute NRBCs 0.0 10e3/uL   Hepatic function panel   Result Value Ref Range    Protein Total 7.7 6.4 - 8.3 g/dL    Albumin 4.6 3.5 - 5.2 g/dL    Bilirubin Total 0.3 <=1.2 mg/dL    Alkaline Phosphatase 117 40 - 150 U/L     (H) 0 - 45 U/L    ALT 63 (H) 0 - 50 U/L    Bilirubin Direct <0.20 0.00 - 0.30 mg/dL   Lipase   Result Value Ref Range    Lipase 27 13 - 60 U/L   HCG qualitative  pregnancy (blood)   Result Value Ref Range    hCG Serum Qualitative Negative Negative   US Abdomen Limited (RUQ)    Narrative    EXAM: US ABDOMEN LIMITED  LOCATION: [facility]  DATE: 12/25/2024    INDICATION: Right upper quadrant pain, elevated transaminases  COMPARISON: None.  TECHNIQUE: Limited abdominal ultrasound.    FINDINGS:    GALLBLADDER: Gallstones in an otherwise normal gallbladder. No wall thickening, or pericholecystic fluid. Negative sonographic Osborn's sign.    BILE DUCTS: No biliary dilatation. The common duct measures 6 mm.    LIVER: Normal parenchyma with smooth contour. No focal mass.    RIGHT KIDNEY: No hydronephrosis.    PANCREAS: The visualized portions are normal.    No ascites.      Impression    IMPRESSION:  1.  Cholelithiasis with small gallstones identified. Gallbladder wall thickness upper limits of normal, no tenderness noted.  2.  Bile ducts appear normal.           Medications   ketorolac (TORADOL) injection 15 mg (15 mg Intravenous $Given 12/24/24 9403)       Assessments & Plan (with Medical Decision Making)     I have reviewed the nursing notes.    I have reviewed the findings, diagnosis, plan and need for follow up with the patient.          Medical Decision Making  Tammy Obregon is a 39 year old female who has hyperlipidemia, who presents to the emergency department for evaluation of abdominal pain.  Vital signs reviewed and reassuring.  Patient is nontoxic on exam.  Appears slightly uncomfortable and has some tenderness in the right upper quadrant.  Most concern for hepatobiliary pathology.  She is given Toradol for pain.  Will plan for labs and ultrasound.  Ultrasound shows cholelithiasis without evidence of cholecystitis.  No CBD dilation.  Labs notable for negative pregnancy.  Normal lipase.  BMP reassuring.  She has a leukocytosis of 15.7 with left shift.  No anemia.  AST is 114 and ALT is 63.  Her bilirubin is normal and alkaline phosphatase is  normal.  I reassessed the patient and she is feeling much better.  At this time, suspect biliary colic and symptomatic cholelithiasis.  I discussed the case with Dr. Mariscal of general surgery to see if there is any indication for emergent cholecystectomy.  Given the patient's improvement in symptoms and reassuring vitals, she recommends close outpatient follow-up with strict return precautions.  She also recommends that the patient eat less than 15 g of fat per day.  I discussed this with the patient and she is in agreement with the plan.  She is instructed to avoid greasy fatty foods.  Urgent surgery referral given.  Strict return precautions were discussed.        Discharge Medication List as of 12/25/2024  1:20 AM          Final diagnoses:   Symptomatic cholelithiasis       12/24/2024   [facility] EMERGENCY DEPT       Juan Hutchins MD  12/25/24 [id number]

## 2024-12-25 NOTE — DISCHARGE INSTRUCTIONS
I suspect that your symptoms are due to stones in your gallbladder.  I discussed the case with general surgery and they are recommending following up in the clinic.  You will likely need to have your gallbladder taken out in the future.  In the meantime, avoid eating greasy fatty foods, the official recommendation is to eat less than 15 g of fats per day.  Use Tylenol or ibuprofen as needed.  If you develop fever, severe pain, or any other new or concerning symptoms return to the ER.

## 2024-12-25 NOTE — ED TRIAGE NOTES
Pt presents with RUQ pain that started around 2 hours ago. Reports fluctuation in intensity. Does have gall bladder. No hx of gall bladder issues. Denies n/v/d      Triage Assessment (Adult)       Row Name 12/24/24 5327          Triage Assessment    Airway WDL WDL        Respiratory WDL    Respiratory WDL WDL        Skin Circulation/Temperature WDL    Skin Circulation/Temperature WDL WDL        Cardiac WDL    Cardiac WDL WDL        Peripheral/Neurovascular WDL    Peripheral Neurovascular WDL WDL        Cognitive/Neuro/Behavioral WDL    Cognitive/Neuro/Behavioral WDL WDL

## 2025-01-06 ENCOUNTER — OFFICE VISIT (OUTPATIENT)
Dept: SURGERY | Facility: CLINIC | Age: 40
End: 2025-01-06
Attending: STUDENT IN AN ORGANIZED HEALTH CARE EDUCATION/TRAINING PROGRAM
Payer: COMMERCIAL

## 2025-01-06 ENCOUNTER — TELEPHONE (OUTPATIENT)
Dept: SURGERY | Facility: CLINIC | Age: 40
End: 2025-01-06

## 2025-01-06 VITALS
BODY MASS INDEX: 31.41 KG/M2 | TEMPERATURE: 97.6 F | WEIGHT: 183 LBS | SYSTOLIC BLOOD PRESSURE: 126 MMHG | HEART RATE: 74 BPM | DIASTOLIC BLOOD PRESSURE: 84 MMHG

## 2025-01-06 DIAGNOSIS — K80.20 SYMPTOMATIC CHOLELITHIASIS: ICD-10-CM

## 2025-01-06 PROCEDURE — 99243 OFF/OP CNSLTJ NEW/EST LOW 30: CPT | Performed by: STUDENT IN AN ORGANIZED HEALTH CARE EDUCATION/TRAINING PROGRAM

## 2025-01-06 ASSESSMENT — PAIN SCALES - GENERAL: PAINLEVEL_OUTOF10: NO PAIN (0)

## 2025-01-06 NOTE — LETTER
1/6/2025      Tammy Obregon  [address]  [place] [address]      Dear Colleague,    Thank you for referring your patient, Tammy Obregon, to the [facility]. Please see a copy of my visit note below.    Assessment and Plan:    Tammy Obregon is a 39 year old female seen in consultation for Abdominal pain, right upper quadrant at the request of Skylar Arceo MD.    It is my impression that Tammy has symptomatic cholelithiasis.   I have offered her a robotic-assisted laparoscopic cholecystectomy with possible intraoperative cholangiogram.      We have discussed the indication, alternatives, risks and expected recovery.  Specifically we have discussed incisions, scarring, postoperative infections, anesthesia, bleeding, blood transfusion, open conversion, common bile duct injury, injury to intra-abdominal organs, adhesions that can lead to bowel obstruction, retained common bile duct stone, bile leak, DVT, PE, hernia, post cholecystectomy diarrhea, postoperative dietary restrictions and physical limitations.  We have discussed the recommended interventions and treatments for these complications.  We also discussed the potential for development of acute cholecystitis while awaiting surgery. This may present as persistent pain, nausea, emesis, fevers, chills. If these symptoms develop, patient to seek care in emergency department for evaluation of possible urgent cholecystectomy. Patient expressed understanding and all questions have been answered to the best of my ability.         Comorbidities:  none    We will schedule surgery at the patient's convenience.      Chief complaint:  Abdominal pain, epigastric  Abdominal pain, right upper quadrant    HPI:  Tammy Obregon is a 39 year old female who presents with intermittent epigastric and right upper quadrant pain for several years.  The pain is associated with eating fatty foods.  Positive for associated symptoms of nausea  and chills.  Negative for associated symptoms of vomiting and fever.  She does not have a history of jaundice or dark urine.  She  has not had pancreatitis in the past.        Past Medical History:   has a past medical history of Migraine.    Past Surgical History:  Past Surgical History:   Procedure Laterality Date      SECTION N/A 2020    Procedure:  SECTION;  Surgeon: So Cross MD;  Location: [facility];  Service: Obstetrics     OTHER SURGICAL HISTORY      dental surgery       Social History:  Social History     Socioeconomic History     Marital status:      Spouse name: Not on file     Number of children: Not on file     Years of education: Not on file     Highest education level: Not on file   Occupational History     Not on file   Tobacco Use     Smoking status: Never     Smokeless tobacco: Never   Vaping Use     Vaping status: Never Used   Substance and Sexual Activity     Alcohol use: Not on file     Drug use: Not on file     Sexual activity: Not on file   Other Topics Concern     Not on file   Social History Narrative     Not on file     Social Drivers of Health     Financial Resource Strain: Low Risk  (2024)    Financial Resource Strain      Within the past 12 months, have you or your family members you live with been unable to get utilities (heat, electricity) when it was really needed?: No   Food Insecurity: Low Risk  (2024)    Food Insecurity      Within the past 12 months, did you worry that your food would run out before you got money to buy more?: No      Within the past 12 months, did the food you bought just not last and you didn t have money to get more?: No   Transportation Needs: Low Risk  (2024)    Transportation Needs      Within the past 12 months, has lack of transportation kept you from medical appointments, getting your medicines, non-medical meetings or appointments, work, or from getting things that you need?: No   Physical  Activity: Insufficiently Active (9/22/2024)    Exercise Vital Sign      Days of Exercise per Week: 1 day      Minutes of Exercise per Session: 20 min   Stress: Stress Concern Present (9/22/2024)    [employer] [place] [employer] - Occupational Stress Questionnaire      Feeling of Stress : Very much   Social Connections: Unknown (9/22/2024)    Social Connection and Isolation Panel [NHANES]      Frequency of Communication with Friends and Family: Not on file      Frequency of Social Gatherings with Friends and Family: Once a week      Attends [employer] Services: Not on file      Active Member of Clubs or Organizations: Not on file      Attends Club or Organization Meetings: Not on file      Marital Status: Not on file   Interpersonal Safety: Low Risk  (9/24/2024)    Interpersonal Safety      Do you feel physically and emotionally safe where you currently live?: Yes      Within the past 12 months, have you been hit, slapped, kicked or otherwise physically hurt by someone?: No      Within the past 12 months, have you been humiliated or emotionally abused in other ways by your partner or ex-partner?: No   Housing Stability: Low Risk  (9/22/2024)    Housing Stability      Do you have housing? : Yes      Are you worried about losing your housing?: No        Family History:  Family History   Problem Relation Age of Onset     Hyperlipidemia Father      Hearing Loss Maternal Grandfather      No Known Problems Paternal Grandmother      Hearing Loss Paternal Grandfather      Breast Cancer Maternal Aunt 40.00     Family history reviewed and is not pertinent    Review of Systems:  The 10 point review of systems is negative other than noted in the HPI and above.    Physical Exam:  Vitals: LMP 12/11/2024   BMI= There is no height or weight on file to calculate BMI.  General - Well developed, well nourished female in no apparent distress  HEENT:  Head normocephalic and atraumatic, pupils equal and round, conjunctivae  clear, no scleral icterus, mucous membranes moist, external ears and nose normal  Neck: Supple without thyromegaly or masses  Lymphatic: No cervical, or supraclavicular lymphadenopathy  Pulmonary: Clear to auscultation bilaterally  Abdomen:   soft, flat, non-distended with no tenderness noted diffusely and Osborn's sign is absent. no masses palpated.  Extremities: Warm without edema  Musculoskeletal:  Normal station and gait  Neurologic: alert, speech is clear, moves all extremities with good strength  Psychiatric: Mood and affect appropriate  Skin: Without lesions, rashes or juandice    Relevant labs:    WBC -   Lab Results   Component Value Date    WBC 15.7 (H) 12/24/2024       HgB -   Lab Results   Component Value Date    HGB 12.5 12/24/2024       Plt-   Lab Results   Component Value Date     12/24/2024       Liver Function Studies -   Recent Labs   Lab Test 12/24/24  2256   PROTTOTAL 7.7   ALBUMIN 4.6   BILITOTAL 0.3   ALKPHOS 117   *   ALT 63*       Lipase-   Lab Results   Component Value Date    LIPASE 27 12/24/2024           Imaging:  All imaging studies reviewed by me.    Ultrasound RUQ: positive cholelithiasis, negative gallbladder wall thickening, negative ductal dilatation, negative pericholecystic fluid, negative sonographic Osborn's sign.    Recent Results (from the past 744 hours)   US Abdomen Limited (RUQ)    Narrative    EXAM: US ABDOMEN LIMITED  LOCATION: [facility]  DATE: 12/25/2024    INDICATION: Right upper quadrant pain, elevated transaminases  COMPARISON: None.  TECHNIQUE: Limited abdominal ultrasound.    FINDINGS:    GALLBLADDER: Gallstones in an otherwise normal gallbladder. No wall thickening, or pericholecystic fluid. Negative sonographic Osborn's sign.    BILE DUCTS: No biliary dilatation. The common duct measures 6 mm.    LIVER: Normal parenchyma with smooth contour. No focal mass.    RIGHT KIDNEY: No hydronephrosis.    PANCREAS: The visualized  portions are normal.    No ascites.      Impression    IMPRESSION:  1.  Cholelithiasis with small gallstones identified. Gallbladder wall thickness upper limits of normal, no tenderness noted.  2.  Bile ducts appear normal.             This note was created using voice recognition software. Undetected word substitutions or other errors may have occurred.     Time spent with the patient with greater that 50% of the time in discussion was 30 minutes.     Julio Anglin MD    Please route or send letter to:  Primary Care Provider (PCP)      Again, thank you for allowing me to participate in the care of your patient.        Sincerely,        Julio Anglin MD    Electronically signed

## 2025-01-06 NOTE — TELEPHONE ENCOUNTER
Left message for patient to call us back to discuss surgery dates    Aracely Felipe M.A.  Specialty surgery Scheduler

## 2025-01-06 NOTE — PROGRESS NOTES
Assessment and Plan:    Tammy Obregon is a 39 year old female seen in consultation for Abdominal pain, right upper quadrant at the request of Skylar Arceo MD.    It is my impression that Tammy has symptomatic cholelithiasis.   I have offered her a robotic-assisted laparoscopic cholecystectomy with possible intraoperative cholangiogram.      We have discussed the indication, alternatives, risks and expected recovery.  Specifically we have discussed incisions, scarring, postoperative infections, anesthesia, bleeding, blood transfusion, open conversion, common bile duct injury, injury to intra-abdominal organs, adhesions that can lead to bowel obstruction, retained common bile duct stone, bile leak, DVT, PE, hernia, post cholecystectomy diarrhea, postoperative dietary restrictions and physical limitations.  We have discussed the recommended interventions and treatments for these complications.  We also discussed the potential for development of acute cholecystitis while awaiting surgery. This may present as persistent pain, nausea, emesis, fevers, chills. If these symptoms develop, patient to seek care in emergency department for evaluation of possible urgent cholecystectomy. Patient expressed understanding and all questions have been answered to the best of my ability.         Comorbidities:  none    We will schedule surgery at the patient's convenience.      Chief complaint:  Abdominal pain, epigastric  Abdominal pain, right upper quadrant    HPI:  Tammy Obregon is a 39 year old female who presents with intermittent epigastric and right upper quadrant pain for several years.  The pain is associated with eating fatty foods.  Positive for associated symptoms of nausea and chills.  Negative for associated symptoms of vomiting and fever.  She does not have a history of jaundice or dark urine.  She  has not had pancreatitis in the past.        Past Medical History:   has a past medical history of  Migraine.    Past Surgical History:  Past Surgical History:   Procedure Laterality Date     SECTION N/A 2020    Procedure:  SECTION;  Surgeon: So Cross MD;  Location: [facility];  Service: Obstetrics    OTHER SURGICAL HISTORY      dental surgery       Social History:  Social History     Socioeconomic History    Marital status:      Spouse name: Not on file    Number of children: Not on file    Years of education: Not on file    Highest education level: Not on file   Occupational History    Not on file   Tobacco Use    Smoking status: Never    Smokeless tobacco: Never   Vaping Use    Vaping status: Never Used   Substance and Sexual Activity    Alcohol use: Not on file    Drug use: Not on file    Sexual activity: Not on file   Other Topics Concern    Not on file   Social History Narrative    Not on file     Social Drivers of Health     Financial Resource Strain: Low Risk  (2024)    Financial Resource Strain     Within the past 12 months, have you or your family members you live with been unable to get utilities (heat, electricity) when it was really needed?: No   Food Insecurity: Low Risk  (2024)    Food Insecurity     Within the past 12 months, did you worry that your food would run out before you got money to buy more?: No     Within the past 12 months, did the food you bought just not last and you didn t have money to get more?: No   Transportation Needs: Low Risk  (2024)    Transportation Needs     Within the past 12 months, has lack of transportation kept you from medical appointments, getting your medicines, non-medical meetings or appointments, work, or from getting things that you need?: No   Physical Activity: Insufficiently Active (2024)    Exercise Vital Sign     Days of Exercise per Week: 1 day     Minutes of Exercise per Session: 20 min   Stress: Stress Concern Present (2024)    [employer] [place] [employer] - Occupational  Stress Questionnaire     Feeling of Stress : Very much   Social Connections: Unknown (9/22/2024)    Social Connection and Isolation Panel [NHANES]     Frequency of Communication with Friends and Family: Not on file     Frequency of Social Gatherings with Friends and Family: Once a week     Attends [employer] Services: Not on file     Active Member of Clubs or Organizations: Not on file     Attends Club or Organization Meetings: Not on file     Marital Status: Not on file   Interpersonal Safety: Low Risk  (9/24/2024)    Interpersonal Safety     Do you feel physically and emotionally safe where you currently live?: Yes     Within the past 12 months, have you been hit, slapped, kicked or otherwise physically hurt by someone?: No     Within the past 12 months, have you been humiliated or emotionally abused in other ways by your partner or ex-partner?: No   Housing Stability: Low Risk  (9/22/2024)    Housing Stability     Do you have housing? : Yes     Are you worried about losing your housing?: No        Family History:  Family History   Problem Relation Age of Onset    Hyperlipidemia Father     Hearing Loss Maternal Grandfather     No Known Problems Paternal Grandmother     Hearing Loss Paternal Grandfather     Breast Cancer Maternal Aunt 40.00     Family history reviewed and is not pertinent    Review of Systems:  The 10 point review of systems is negative other than noted in the HPI and above.    Physical Exam:  Vitals: LMP 12/11/2024   BMI= There is no height or weight on file to calculate BMI.  General - Well developed, well nourished female in no apparent distress  HEENT:  Head normocephalic and atraumatic, pupils equal and round, conjunctivae clear, no scleral icterus, mucous membranes moist, external ears and nose normal  Neck: Supple without thyromegaly or masses  Lymphatic: No cervical, or supraclavicular lymphadenopathy  Pulmonary: Clear to auscultation bilaterally  Abdomen:   soft, flat, non-distended with no  tenderness noted diffusely and Osborn's sign is absent. no masses palpated.  Extremities: Warm without edema  Musculoskeletal:  Normal station and gait  Neurologic: alert, speech is clear, moves all extremities with good strength  Psychiatric: Mood and affect appropriate  Skin: Without lesions, rashes or juandice    Relevant labs:    WBC -   Lab Results   Component Value Date    WBC 15.7 (H) 12/24/2024       HgB -   Lab Results   Component Value Date    HGB 12.5 12/24/2024       Plt-   Lab Results   Component Value Date     12/24/2024       Liver Function Studies -   Recent Labs   Lab Test 12/24/24  2256   PROTTOTAL 7.7   ALBUMIN 4.6   BILITOTAL 0.3   ALKPHOS 117   *   ALT 63*       Lipase-   Lab Results   Component Value Date    LIPASE 27 12/24/2024           Imaging:  All imaging studies reviewed by me.    Ultrasound RUQ: positive cholelithiasis, negative gallbladder wall thickening, negative ductal dilatation, negative pericholecystic fluid, negative sonographic Osborn's sign.    Recent Results (from the past 744 hours)   US Abdomen Limited (RUQ)    Narrative    EXAM: US ABDOMEN LIMITED  LOCATION: [facility]  DATE: 12/25/2024    INDICATION: Right upper quadrant pain, elevated transaminases  COMPARISON: None.  TECHNIQUE: Limited abdominal ultrasound.    FINDINGS:    GALLBLADDER: Gallstones in an otherwise normal gallbladder. No wall thickening, or pericholecystic fluid. Negative sonographic Osborn's sign.    BILE DUCTS: No biliary dilatation. The common duct measures 6 mm.    LIVER: Normal parenchyma with smooth contour. No focal mass.    RIGHT KIDNEY: No hydronephrosis.    PANCREAS: The visualized portions are normal.    No ascites.      Impression    IMPRESSION:  1.  Cholelithiasis with small gallstones identified. Gallbladder wall thickness upper limits of normal, no tenderness noted.  2.  Bile ducts appear normal.             This note was created using voice  recognition software. Undetected word substitutions or other errors may have occurred.     Time spent with the patient with greater that 50% of the time in discussion was 30 minutes.     Julio Anglin MD    Please route or send letter to:  Primary Care Provider (PCP)

## 2025-01-06 NOTE — TELEPHONE ENCOUNTER
Type of surgery: CHOLECYSTECTOMY, ROBOT-ASSISTED, LAPAROSCOPIC, USING DA MITZY XI   Location of surgery: [place] [facility]  Date and time of surgery: 1/29/25  Surgeon: Derrek   Pre-Op Appt Date: 1/24  Post-Op Appt Date: 2/10   Packet sent out: Yes  Pre-cert/Authorization completed:  Not Applicable  Date:

## 2025-01-06 NOTE — NURSING NOTE
"Initial /84 (BP Location: Right arm, Patient Position: Chair, Cuff Size: Adult Regular)   Pulse 74   Temp 97.6  F (36.4  C) (Tympanic)   Wt 83 kg (183 lb)   LMP 12/11/2024   BMI 31.41 kg/m   Estimated body mass index is 31.41 kg/m  as calculated from the following:    Height as of 12/24/24: 1.626 m (5' 4\").    Weight as of this encounter: 83 kg (183 lb). .  Makenzie Navas LPN    "

## 2025-01-24 NOTE — H&P (VIEW-ONLY)
Preoperative Evaluation  [facility]  [address] [place] [address]  Phone: [phone]  Primary Provider: Skylar Arceo MD  Pre-op Performing Provider: Skylar Arceo MD  Jan 24, 2025 1/22/2025   Surgical Information   What procedure is being done? Cholecystectomy   Facility or Hospital where procedure/surgery will be performed: [facility]   Who is doing the procedure / surgery? Dr. Julio Anglin   Date of surgery / procedure: 1/29/2025   Time of surgery / procedure: To be determined   Where do you plan to recover after surgery? at home with family     Fax number for surgical facility: Note does not need to be faxed, will be available electronically in Epic.    Assessment & Plan     The proposed surgical procedure is considered INTERMEDIATE risk.    Preop general physical exam  - Basic metabolic panel  (Ca, Cl, CO2, Creat, Gluc, K, Na, BUN); Future  - CBC with platelets; Future  - HCG qualitative, Blood ([id number]); Future  - Basic metabolic panel  (Ca, Cl, CO2, Creat, Gluc, K, Na, BUN)  - CBC with platelets  - HCG qualitative, Blood ([id number])  - Hepatic panel (Albumin, ALT, AST, Bili, Alk Phos, TP); Future  - Hepatic panel (Albumin, ALT, AST, Bili, Alk Phos, TP)    Gallstones    Reactive depression  Increase to 100mg daily - will wait until after surgery to increase  - sertraline (ZOLOFT) 100 MG tablet; Take 1 tablet (100 mg) by mouth daily.            - No identified additional risk factors other than previously addressed    Preoperative Medication Instructions  Antiplatelet or Anticoagulation Medication Instructions   - Patient is on no antiplatelet or anticoagulation medications.    Additional Medication Instructions  Take all scheduled medications on the day of surgery    Recommendation  Approval given to proceed with proposed procedure, without further diagnostic evaluation.    Abner Moreno is a 39 year old, presenting  "for the following:  Pre-Op Exam ([facility]/1/29/2025      10:00 AM/CHOLECYSTECTOMY, ROBOT-ASSISTED, LAPAROSCOPIC, USING DA MITZY XI/Julio Anglin MD)      HPI related to upcoming procedure: On Christmas Darlene patient had a \"gallbladder attack\" after which she ended up in the emergency department.  Ultrasound shows gallstones.  Has had a few mild episodes since that time.        1/22/2025   Pre-Op Questionnaire   Have you ever had a heart attack or stroke? No   Have you ever had surgery on your heart or blood vessels, such as a stent placement, a coronary artery bypass, or surgery on an artery in your head, neck, heart, or legs? No   Do you have chest pain with activity? No   Do you have a history of heart failure? No   Do you currently have a cold, bronchitis or symptoms of other infection? Mild congestion - no fevers   Do you have a cough, shortness of breath, or wheezing? No   Do you or anyone in your family have previous history of blood clots? No   Do you or does anyone in your family have a serious bleeding problem such as prolonged bleeding following surgeries or cuts? No   Have you ever had problems with anemia or been told to take iron pills? No   Have you had any abnormal blood loss such as black, tarry or bloody stools, or abnormal vaginal bleeding? No   Have you ever had a blood transfusion? No   Are you willing to have a blood transfusion if it is medically needed before, during, or after your surgery? Yes   Have you or any of your relatives ever had problems with anesthesia? No   Do you have sleep apnea, excessive snoring or daytime drowsiness? No   Do you have any artifical heart valves or other implanted medical devices like a pacemaker, defibrillator, or continuous glucose monitor? No   Do you have artificial joints? No   Are you allergic to latex? No     Health Care Directive  Patient does not have a Health Care Directive: Discussed advance care planning with patient; " "however, patient declined at this time.    Preoperative Review of    reviewed - no record of controlled substances prescribed.          Patient Active Problem List    Diagnosis Date Noted    Vitamin B12 Deficiency      Priority: Medium     Created by Conversion        Hyperlipidemia      Priority: Medium     Created by Conversion        Obesity      Priority: Medium     Created by Conversion        Anxiety      Priority: Medium     Created by Conversion  Replacement Utility updated for latest IMO load        Allergic Rhinitis      Priority: Medium     Created by Conversion  Replacement Utility updated for latest IMO load          Past Medical History:   Diagnosis Date    Migraine      Past Surgical History:   Procedure Laterality Date     SECTION N/A 2020    Procedure:  SECTION;  Surgeon: So Cross MD;  Location: [facility] L[facility] OR;  Service: Obstetrics    OTHER SURGICAL HISTORY      dental surgery     Current Outpatient Medications   Medication Sig Dispense Refill    sertraline (ZOLOFT) 50 MG tablet Take 0.5 tablets (25 mg) by mouth daily for 10 days, THEN 1 tablet (50 mg) daily. 85 tablet 1       No Known Allergies     Social History     Tobacco Use    Smoking status: Never    Smokeless tobacco: Never   Substance Use Topics    Alcohol use: Yes     Comment: occas     Family History   Problem Relation Age of Onset    Hyperlipidemia Father     Hearing Loss Maternal Grandfather     No Known Problems Paternal Grandmother     Hearing Loss Paternal Grandfather     Breast Cancer Maternal Aunt 40     History   Drug Use Unknown             Review of Systems  Constitutional, HEENT, cardiovascular, pulmonary, GI, , musculoskeletal, neuro, skin, endocrine and psych systems are negative, except as otherwise noted.    Objective    /72   Pulse 86   Temp 98.9  F (37.2  C) (Tympanic)   Resp 16   Ht 1.626 m (5' 4\")   Wt 81.2 kg (179 lb)   LMP 2024   SpO2 98%   BMI 30.73 " "kg/m     Estimated body mass index is 30.73 kg/m  as calculated from the following:    Height as of this encounter: 1.626 m (5' 4\").    Weight as of this encounter: 81.2 kg (179 lb).  Physical Exam  GENERAL: alert and no distress  NECK: no adenopathy, no asymmetry, masses, or scars  RESP: lungs clear to auscultation - no rales, rhonchi or wheezes  CV: regular rate and rhythm, normal S1 S2, no S3 or S4, no murmur, click or rub, no peripheral edema  ABDOMEN: soft, nontender, no hepatosplenomegaly, no masses and bowel sounds normal  MS: no gross musculoskeletal defects noted, no edema    Recent Labs   Lab Test 12/24/24  2256 09/24/24  1610   HGB 12.5 12.8    363    139   POTASSIUM 4.1 3.8   CR 0.61 0.71        Diagnostics  Recent Results (from the past 24 hours)   CBC with platelets    Collection Time: 01/24/25  3:29 PM   Result Value Ref Range    WBC Count 8.5 4.0 - 11.0 10e3/uL    RBC Count 4.57 3.80 - 5.20 10e6/uL    Hemoglobin 12.9 11.7 - 15.7 g/dL    Hematocrit 38.3 35.0 - 47.0 %    MCV 84 78 - 100 fL    MCH 28.2 26.5 - 33.0 pg    MCHC 33.7 31.5 - 36.5 g/dL    RDW 12.4 10.0 - 15.0 %    Platelet Count 354 150 - 450 10e3/uL      No EKG required, no history of coronary heart disease, significant arrhythmia, peripheral arterial disease or other structural heart disease.    Revised Cardiac Risk Index (RCRI)  The patient has the following serious cardiovascular risks for perioperative complications:   - No serious cardiac risks = 0 points     RCRI Interpretation: 0 points: Class I (very low risk - 0.4% complication rate)         Signed Electronically by: Skylar Arceo MD  A copy of this evaluation report is provided to the requesting physician.        "

## 2025-01-27 ENCOUNTER — ANESTHESIA EVENT (OUTPATIENT)
Dept: SURGERY | Facility: CLINIC | Age: 40
End: 2025-01-27
Payer: COMMERCIAL

## 2025-01-27 NOTE — ANESTHESIA PREPROCEDURE EVALUATION
Anesthesia Pre-Procedure Evaluation    Patient: Tammy Obregon   MRN: [id number] : 1985        Procedure : Procedure(s):  CHOLECYSTECTOMY, ROBOT-ASSISTED, LAPAROSCOPIC, USING DA MITZY XI          Past Medical History:   Diagnosis Date    Migraine       Past Surgical History:   Procedure Laterality Date     SECTION N/A 2020    Procedure:  SECTION;  Surgeon: So Cross MD;  [facility];  Service: Obstetrics    OTHER SURGICAL HISTORY      dental surgery    wisdom teeth extraction        No Known Allergies   Social History     Tobacco Use    Smoking status: Never    Smokeless tobacco: Never   Substance Use Topics    Alcohol use: Yes     Comment: occas      Wt Readings from Last 1 Encounters:   25 81.2 kg (179 lb)        Anesthesia Evaluation   Pt has had prior anesthetic. Type: Regional.        ROS/MED HX  ENT/Pulmonary:     (+)           allergic rhinitis,                             Neurologic:     (+)      migraines,                          Cardiovascular:     (+) Dyslipidemia - -   -  - -                                      METS/Exercise Tolerance:     Hematologic:  - neg hematologic  ROS     Musculoskeletal:  - neg musculoskeletal ROS     GI/Hepatic:  - neg GI/hepatic ROS     Renal/Genitourinary:  - neg Renal ROS     Endo:     (+)               Obesity,       Psychiatric/Substance Use:     (+) psychiatric history anxiety       Infectious Disease:  - neg infectious disease ROS     Malignancy:  - neg malignancy ROS     Other:  - neg other ROS          Physical Exam    Airway        Mallampati: II   TM distance: > 3 FB   Neck ROM: full   Mouth opening: > 3 cm    Respiratory Devices and Support         Dental       (+) Completely normal teeth      Cardiovascular   cardiovascular exam normal          Pulmonary   pulmonary exam normal              OUTSIDE LABS:  CBC:   Lab Results   Component Value Date    WBC 8.5 2025    WBC 15.7 (H) 2024     HGB 12.9 01/24/2025    HGB 12.5 12/24/2024    HCT 38.3 01/24/2025    HCT 37.2 12/24/2024     01/24/2025     12/24/2024     BMP:   Lab Results   Component Value Date     01/24/2025     12/24/2024    POTASSIUM 4.2 01/24/2025    POTASSIUM 4.1 12/24/2024    CHLORIDE 102 01/24/2025    CHLORIDE 98 12/24/2024    CO2 25 01/24/2025    CO2 25 12/24/2024    BUN 11.9 01/24/2025    BUN 9.6 12/24/2024    CR 0.63 01/24/2025    CR 0.61 12/24/2024    GLC 98 01/24/2025     (H) 12/24/2024     COAGS:   Lab Results   Component Value Date    PTT 24 02/16/2020    INR 0.98 02/16/2020     POC:   Lab Results   Component Value Date    HCG Negative 08/02/2021    HCGS Negative 01/24/2025     HEPATIC:   Lab Results   Component Value Date    ALBUMIN 4.4 01/24/2025    PROTTOTAL 7.7 01/24/2025    ALT 10 01/24/2025    AST 16 01/24/2025    ALKPHOS 99 01/24/2025    BILITOTAL <0.2 01/24/2025     OTHER:   Lab Results   Component Value Date    A1C 5.4 04/28/2021    MARCY 9.5 01/24/2025    LIPASE 27 12/24/2024    TSH 1.38 03/12/2024       Anesthesia Plan    ASA Status:  2       Anesthesia Type: General.     - Airway: ETT   Induction: Propofol.   Maintenance: TIVA.        Consents    Anesthesia Plan(s) and associated risks, benefits, and realistic alternatives discussed. Questions answered and patient/representative(s) expressed understanding.     - Discussed: Risks, Benefits and Alternatives for BOTH SEDATION and the PROCEDURE were discussed     - Discussed with:  Patient            Postoperative Care    Pain management: IV analgesics, Oral pain medications, Multi-modal analgesia.   PONV prophylaxis: Ondansetron (or other 5HT-3), Dexamethasone or Solumedrol     Comments:               CORY Crane CRNA    I have reviewed the pertinent notes and labs in the chart from the past 30 days and (re)examined the patient.  Any updates or changes from those notes are reflected in this note.    Clinically Significant Risk Factors  "Present on Admission                             # Obesity: Estimated body mass index is 30.73 kg/m  as calculated from the following:    Height as of 1/24/25: 1.626 m (5' 4\").    Weight as of 1/24/25: 81.2 kg (179 lb).                "

## 2025-01-29 ENCOUNTER — ANESTHESIA (OUTPATIENT)
Dept: SURGERY | Facility: CLINIC | Age: 40
End: 2025-01-29
Payer: COMMERCIAL

## 2025-01-29 ENCOUNTER — HOSPITAL ENCOUNTER (OUTPATIENT)
Facility: CLINIC | Age: 40
Discharge: HOME | End: 2025-01-29
Attending: STUDENT IN AN ORGANIZED HEALTH CARE EDUCATION/TRAINING PROGRAM | Admitting: STUDENT IN AN ORGANIZED HEALTH CARE EDUCATION/TRAINING PROGRAM
Payer: COMMERCIAL

## 2025-01-29 VITALS
OXYGEN SATURATION: 95 % | DIASTOLIC BLOOD PRESSURE: 76 MMHG | SYSTOLIC BLOOD PRESSURE: 118 MMHG | WEIGHT: 179 LBS | HEIGHT: 64 IN | BODY MASS INDEX: 30.56 KG/M2 | TEMPERATURE: 98 F | RESPIRATION RATE: 16 BRPM | HEART RATE: 64 BPM

## 2025-01-29 DIAGNOSIS — K80.20 SYMPTOMATIC CHOLELITHIASIS: Primary | ICD-10-CM

## 2025-01-29 PROCEDURE — 370N000017 HC ANESTHESIA TECHNICAL FEE, PER MIN: Performed by: STUDENT IN AN ORGANIZED HEALTH CARE EDUCATION/TRAINING PROGRAM

## 2025-01-29 PROCEDURE — 250N000011 HC RX IP 250 OP 636: Performed by: NURSE ANESTHETIST, CERTIFIED REGISTERED

## 2025-01-29 PROCEDURE — 250N000009 HC RX 250: Performed by: NURSE ANESTHETIST, CERTIFIED REGISTERED

## 2025-01-29 PROCEDURE — 250N000011 HC RX IP 250 OP 636: Performed by: STUDENT IN AN ORGANIZED HEALTH CARE EDUCATION/TRAINING PROGRAM

## 2025-01-29 PROCEDURE — 710N000012 HC RECOVERY PHASE 2, PER MINUTE: Performed by: STUDENT IN AN ORGANIZED HEALTH CARE EDUCATION/TRAINING PROGRAM

## 2025-01-29 PROCEDURE — 47562 LAPAROSCOPIC CHOLECYSTECTOMY: CPT | Performed by: STUDENT IN AN ORGANIZED HEALTH CARE EDUCATION/TRAINING PROGRAM

## 2025-01-29 PROCEDURE — 250N000011 HC RX IP 250 OP 636

## 2025-01-29 PROCEDURE — 258N000003 HC RX IP 258 OP 636: Performed by: NURSE ANESTHETIST, CERTIFIED REGISTERED

## 2025-01-29 PROCEDURE — 360N000080 HC SURGERY LEVEL 7, PER MIN: Performed by: STUDENT IN AN ORGANIZED HEALTH CARE EDUCATION/TRAINING PROGRAM

## 2025-01-29 PROCEDURE — 999N000141 HC STATISTIC PRE-PROCEDURE NURSING ASSESSMENT: Performed by: STUDENT IN AN ORGANIZED HEALTH CARE EDUCATION/TRAINING PROGRAM

## 2025-01-29 PROCEDURE — 272N000001 HC OR GENERAL SUPPLY STERILE: Performed by: STUDENT IN AN ORGANIZED HEALTH CARE EDUCATION/TRAINING PROGRAM

## 2025-01-29 PROCEDURE — 710N000009 HC RECOVERY PHASE 1, LEVEL 1, PER MIN: Performed by: STUDENT IN AN ORGANIZED HEALTH CARE EDUCATION/TRAINING PROGRAM

## 2025-01-29 PROCEDURE — 271N000001 HC OR GENERAL SUPPLY NON-STERILE: Performed by: STUDENT IN AN ORGANIZED HEALTH CARE EDUCATION/TRAINING PROGRAM

## 2025-01-29 PROCEDURE — 88304 TISSUE EXAM BY PATHOLOGIST: CPT | Mod: TC | Performed by: STUDENT IN AN ORGANIZED HEALTH CARE EDUCATION/TRAINING PROGRAM

## 2025-01-29 PROCEDURE — 88304 TISSUE EXAM BY PATHOLOGIST: CPT | Mod: 26 | Performed by: PATHOLOGY

## 2025-01-29 PROCEDURE — 250N000013 HC RX MED GY IP 250 OP 250 PS 637: Performed by: STUDENT IN AN ORGANIZED HEALTH CARE EDUCATION/TRAINING PROGRAM

## 2025-01-29 PROCEDURE — 250N000009 HC RX 250: Performed by: STUDENT IN AN ORGANIZED HEALTH CARE EDUCATION/TRAINING PROGRAM

## 2025-01-29 PROCEDURE — 250N000013 HC RX MED GY IP 250 OP 250 PS 637: Performed by: NURSE ANESTHETIST, CERTIFIED REGISTERED

## 2025-01-29 RX ORDER — HYDROXYZINE HYDROCHLORIDE 25 MG/1
25 TABLET, FILM COATED ORAL EVERY 6 HOURS PRN
Status: DISCONTINUED | OUTPATIENT
Start: 2025-01-29 | End: 2025-01-29 | Stop reason: HOSPADM

## 2025-01-29 RX ORDER — HYDROMORPHONE HCL IN WATER/PF 6 MG/30 ML
0.2 PATIENT CONTROLLED ANALGESIA SYRINGE INTRAVENOUS EVERY 5 MIN PRN
Status: DISCONTINUED | OUTPATIENT
Start: 2025-01-29 | End: 2025-01-29 | Stop reason: HOSPADM

## 2025-01-29 RX ORDER — ACETAMINOPHEN 325 MG/1
650 TABLET ORAL EVERY 4 HOURS PRN
Qty: 50 TABLET | Refills: 0 | Status: SHIPPED | OUTPATIENT
Start: 2025-01-29

## 2025-01-29 RX ORDER — ACETAMINOPHEN 325 MG/1
650 TABLET ORAL
Status: DISCONTINUED | OUTPATIENT
Start: 2025-01-29 | End: 2025-01-29 | Stop reason: HOSPADM

## 2025-01-29 RX ORDER — ONDANSETRON 2 MG/ML
4 INJECTION INTRAMUSCULAR; INTRAVENOUS EVERY 30 MIN PRN
Status: DISCONTINUED | OUTPATIENT
Start: 2025-01-29 | End: 2025-01-29 | Stop reason: HOSPADM

## 2025-01-29 RX ORDER — CEFAZOLIN SODIUM/WATER 2 G/20 ML
2 SYRINGE (ML) INTRAVENOUS SEE ADMIN INSTRUCTIONS
Status: DISCONTINUED | OUTPATIENT
Start: 2025-01-29 | End: 2025-01-29 | Stop reason: HOSPADM

## 2025-01-29 RX ORDER — HEPARIN SODIUM 5000 [USP'U]/.5ML
5000 INJECTION, SOLUTION INTRAVENOUS; SUBCUTANEOUS
Status: COMPLETED | OUTPATIENT
Start: 2025-01-29 | End: 2025-01-29

## 2025-01-29 RX ORDER — OXYCODONE HYDROCHLORIDE 5 MG/1
5-10 TABLET ORAL EVERY 4 HOURS PRN
Qty: 10 TABLET | Refills: 0 | Status: SHIPPED | OUTPATIENT
Start: 2025-01-29

## 2025-01-29 RX ORDER — FENTANYL CITRATE 50 UG/ML
INJECTION, SOLUTION INTRAMUSCULAR; INTRAVENOUS PRN
Status: DISCONTINUED | OUTPATIENT
Start: 2025-01-29 | End: 2025-01-29

## 2025-01-29 RX ORDER — SODIUM CHLORIDE, SODIUM LACTATE, POTASSIUM CHLORIDE, CALCIUM CHLORIDE 600; 310; 30; 20 MG/100ML; MG/100ML; MG/100ML; MG/100ML
INJECTION, SOLUTION INTRAVENOUS CONTINUOUS
Status: DISCONTINUED | OUTPATIENT
Start: 2025-01-29 | End: 2025-01-29 | Stop reason: HOSPADM

## 2025-01-29 RX ORDER — LIDOCAINE HYDROCHLORIDE AND EPINEPHRINE 10; 10 MG/ML; UG/ML
INJECTION, SOLUTION INFILTRATION; PERINEURAL PRN
Status: DISCONTINUED | OUTPATIENT
Start: 2025-01-29 | End: 2025-01-29 | Stop reason: HOSPADM

## 2025-01-29 RX ORDER — ONDANSETRON 4 MG/1
4 TABLET, ORALLY DISINTEGRATING ORAL EVERY 30 MIN PRN
Status: DISCONTINUED | OUTPATIENT
Start: 2025-01-29 | End: 2025-01-29 | Stop reason: HOSPADM

## 2025-01-29 RX ORDER — ACETAMINOPHEN 325 MG/1
975 TABLET ORAL ONCE
Status: DISCONTINUED | OUTPATIENT
Start: 2025-01-29 | End: 2025-01-29

## 2025-01-29 RX ORDER — METHOCARBAMOL 750 MG/1
750 TABLET, FILM COATED ORAL
Status: COMPLETED | OUTPATIENT
Start: 2025-01-29 | End: 2025-01-29

## 2025-01-29 RX ORDER — LIDOCAINE 40 MG/G
CREAM TOPICAL
Status: DISCONTINUED | OUTPATIENT
Start: 2025-01-29 | End: 2025-01-29 | Stop reason: HOSPADM

## 2025-01-29 RX ORDER — PROPOFOL 10 MG/ML
INJECTION, EMULSION INTRAVENOUS PRN
Status: DISCONTINUED | OUTPATIENT
Start: 2025-01-29 | End: 2025-01-29

## 2025-01-29 RX ORDER — NALOXONE HYDROCHLORIDE 0.4 MG/ML
0.1 INJECTION, SOLUTION INTRAMUSCULAR; INTRAVENOUS; SUBCUTANEOUS
Status: DISCONTINUED | OUTPATIENT
Start: 2025-01-29 | End: 2025-01-29 | Stop reason: HOSPADM

## 2025-01-29 RX ORDER — HYDROXYZINE HYDROCHLORIDE 50 MG/1
50 TABLET, FILM COATED ORAL EVERY 6 HOURS PRN
Status: DISCONTINUED | OUTPATIENT
Start: 2025-01-29 | End: 2025-01-29 | Stop reason: HOSPADM

## 2025-01-29 RX ORDER — FENTANYL CITRATE 50 UG/ML
25 INJECTION, SOLUTION INTRAMUSCULAR; INTRAVENOUS EVERY 5 MIN PRN
Status: DISCONTINUED | OUTPATIENT
Start: 2025-01-29 | End: 2025-01-29 | Stop reason: HOSPADM

## 2025-01-29 RX ORDER — AMOXICILLIN 250 MG
1-2 CAPSULE ORAL 2 TIMES DAILY
Qty: 30 TABLET | Refills: 0 | Status: SHIPPED | OUTPATIENT
Start: 2025-01-29

## 2025-01-29 RX ORDER — INDOCYANINE GREEN AND WATER 25 MG
2.5 KIT INJECTION ONCE
Status: COMPLETED | OUTPATIENT
Start: 2025-01-29 | End: 2025-01-29

## 2025-01-29 RX ORDER — ACETAMINOPHEN 325 MG/1
975 TABLET ORAL ONCE
Status: COMPLETED | OUTPATIENT
Start: 2025-01-29 | End: 2025-01-29

## 2025-01-29 RX ORDER — GABAPENTIN 300 MG/1
300 CAPSULE ORAL
Status: COMPLETED | OUTPATIENT
Start: 2025-01-29 | End: 2025-01-29

## 2025-01-29 RX ORDER — KETAMINE HYDROCHLORIDE 10 MG/ML
INJECTION INTRAMUSCULAR; INTRAVENOUS PRN
Status: DISCONTINUED | OUTPATIENT
Start: 2025-01-29 | End: 2025-01-29

## 2025-01-29 RX ORDER — PROPOFOL 10 MG/ML
INJECTION, EMULSION INTRAVENOUS CONTINUOUS PRN
Status: DISCONTINUED | OUTPATIENT
Start: 2025-01-29 | End: 2025-01-29

## 2025-01-29 RX ORDER — KETOROLAC TROMETHAMINE 30 MG/ML
INJECTION, SOLUTION INTRAMUSCULAR; INTRAVENOUS PRN
Status: DISCONTINUED | OUTPATIENT
Start: 2025-01-29 | End: 2025-01-29

## 2025-01-29 RX ORDER — DEXAMETHASONE SODIUM PHOSPHATE 4 MG/ML
INJECTION, SOLUTION INTRA-ARTICULAR; INTRALESIONAL; INTRAMUSCULAR; INTRAVENOUS; SOFT TISSUE PRN
Status: DISCONTINUED | OUTPATIENT
Start: 2025-01-29 | End: 2025-01-29

## 2025-01-29 RX ORDER — FENTANYL CITRATE 50 UG/ML
50 INJECTION, SOLUTION INTRAMUSCULAR; INTRAVENOUS EVERY 5 MIN PRN
Status: DISCONTINUED | OUTPATIENT
Start: 2025-01-29 | End: 2025-01-29 | Stop reason: HOSPADM

## 2025-01-29 RX ORDER — ONDANSETRON 2 MG/ML
INJECTION INTRAMUSCULAR; INTRAVENOUS PRN
Status: DISCONTINUED | OUTPATIENT
Start: 2025-01-29 | End: 2025-01-29

## 2025-01-29 RX ORDER — OXYCODONE HYDROCHLORIDE 5 MG/1
5 TABLET ORAL
Status: DISCONTINUED | OUTPATIENT
Start: 2025-01-29 | End: 2025-01-29 | Stop reason: HOSPADM

## 2025-01-29 RX ORDER — LIDOCAINE HYDROCHLORIDE 20 MG/ML
INJECTION, SOLUTION INFILTRATION; PERINEURAL PRN
Status: DISCONTINUED | OUTPATIENT
Start: 2025-01-29 | End: 2025-01-29

## 2025-01-29 RX ORDER — CEFAZOLIN SODIUM/WATER 2 G/20 ML
2 SYRINGE (ML) INTRAVENOUS
Status: DISCONTINUED | OUTPATIENT
Start: 2025-01-29 | End: 2025-01-29 | Stop reason: HOSPADM

## 2025-01-29 RX ORDER — DEXAMETHASONE SODIUM PHOSPHATE 4 MG/ML
4 INJECTION, SOLUTION INTRA-ARTICULAR; INTRALESIONAL; INTRAMUSCULAR; INTRAVENOUS; SOFT TISSUE
Status: DISCONTINUED | OUTPATIENT
Start: 2025-01-29 | End: 2025-01-29 | Stop reason: HOSPADM

## 2025-01-29 RX ORDER — BUPIVACAINE HYDROCHLORIDE 5 MG/ML
INJECTION, SOLUTION PERINEURAL PRN
Status: DISCONTINUED | OUTPATIENT
Start: 2025-01-29 | End: 2025-01-29 | Stop reason: HOSPADM

## 2025-01-29 RX ORDER — HYDROMORPHONE HCL IN WATER/PF 6 MG/30 ML
0.4 PATIENT CONTROLLED ANALGESIA SYRINGE INTRAVENOUS EVERY 5 MIN PRN
Status: DISCONTINUED | OUTPATIENT
Start: 2025-01-29 | End: 2025-01-29 | Stop reason: HOSPADM

## 2025-01-29 RX ADMIN — KETOROLAC TROMETHAMINE 15 MG: 30 INJECTION, SOLUTION INTRAMUSCULAR at 12:17

## 2025-01-29 RX ADMIN — PROPOFOL 200 MCG/KG/MIN: 10 INJECTION, EMULSION INTRAVENOUS at 11:05

## 2025-01-29 RX ADMIN — DEXAMETHASONE SODIUM PHOSPHATE 4 MG: 4 INJECTION, SOLUTION INTRA-ARTICULAR; INTRALESIONAL; INTRAMUSCULAR; INTRAVENOUS; SOFT TISSUE at 11:10

## 2025-01-29 RX ADMIN — HYDROMORPHONE HYDROCHLORIDE 0.5 MG: 1 INJECTION, SOLUTION INTRAMUSCULAR; INTRAVENOUS; SUBCUTANEOUS at 11:36

## 2025-01-29 RX ADMIN — LIDOCAINE HYDROCHLORIDE 100 MG: 20 INJECTION, SOLUTION INFILTRATION; PERINEURAL at 11:05

## 2025-01-29 RX ADMIN — ONDANSETRON 4 MG: 2 INJECTION INTRAMUSCULAR; INTRAVENOUS at 12:17

## 2025-01-29 RX ADMIN — ROCURONIUM BROMIDE 30 MG: 50 INJECTION, SOLUTION INTRAVENOUS at 11:33

## 2025-01-29 RX ADMIN — Medication 2.5 MG: at 08:55

## 2025-01-29 RX ADMIN — HYDROMORPHONE HYDROCHLORIDE 0.5 MG: 1 INJECTION, SOLUTION INTRAMUSCULAR; INTRAVENOUS; SUBCUTANEOUS at 12:17

## 2025-01-29 RX ADMIN — HEPARIN SODIUM 5000 UNITS: 10000 INJECTION, SOLUTION INTRAVENOUS; SUBCUTANEOUS at 11:10

## 2025-01-29 RX ADMIN — FENTANYL CITRATE 50 MCG: 50 INJECTION INTRAMUSCULAR; INTRAVENOUS at 11:05

## 2025-01-29 RX ADMIN — MIDAZOLAM 2 MG: 1 INJECTION INTRAMUSCULAR; INTRAVENOUS at 10:58

## 2025-01-29 RX ADMIN — Medication 200 MG: at 12:19

## 2025-01-29 RX ADMIN — METHOCARBAMOL TABLETS 750 MG: 750 TABLET, COATED ORAL at 13:15

## 2025-01-29 RX ADMIN — ROCURONIUM BROMIDE 50 MG: 50 INJECTION, SOLUTION INTRAVENOUS at 11:05

## 2025-01-29 RX ADMIN — ROCURONIUM BROMIDE 10 MG: 50 INJECTION, SOLUTION INTRAVENOUS at 12:14

## 2025-01-29 RX ADMIN — ACETAMINOPHEN 975 MG: 325 TABLET, FILM COATED ORAL at 08:54

## 2025-01-29 RX ADMIN — FENTANYL CITRATE 50 MCG: 50 INJECTION INTRAMUSCULAR; INTRAVENOUS at 10:58

## 2025-01-29 RX ADMIN — HYDROMORPHONE HYDROCHLORIDE 0.4 MG: 0.2 INJECTION, SOLUTION INTRAMUSCULAR; INTRAVENOUS; SUBCUTANEOUS at 13:29

## 2025-01-29 RX ADMIN — PROPOFOL 200 MG: 10 INJECTION, EMULSION INTRAVENOUS at 11:05

## 2025-01-29 RX ADMIN — KETAMINE HYDROCHLORIDE 20 MG: 10 INJECTION INTRAMUSCULAR; INTRAVENOUS at 12:13

## 2025-01-29 RX ADMIN — Medication 2 G: at 10:58

## 2025-01-29 RX ADMIN — GABAPENTIN 300 MG: 300 CAPSULE ORAL at 08:54

## 2025-01-29 RX ADMIN — SODIUM CHLORIDE, POTASSIUM CHLORIDE, SODIUM LACTATE AND CALCIUM CHLORIDE: 600; 310; 30; 20 INJECTION, SOLUTION INTRAVENOUS at 09:55

## 2025-01-29 RX ADMIN — KETAMINE HYDROCHLORIDE 30 MG: 10 INJECTION INTRAMUSCULAR; INTRAVENOUS at 11:20

## 2025-01-29 ASSESSMENT — ACTIVITIES OF DAILY LIVING (ADL)
ADLS_ACUITY_SCORE: 35
ADLS_ACUITY_SCORE: 36
ADLS_ACUITY_SCORE: 35

## 2025-01-29 NOTE — INTERVAL H&P NOTE
"I have reviewed the surgical (or preoperative) H&P that is linked to this encounter, and examined the patient. There are no significant changes    Clinical Conditions Present on Arrival:  Clinically Significant Risk Factors Present on Admission                       # Obesity: Estimated body mass index is 30.71 kg/m  as calculated from the following:    Height as of this encounter: 1.626 m (5' 4.02\").    Weight as of this encounter: 81.2 kg (179 lb).       "

## 2025-01-29 NOTE — ANESTHESIA CARE TRANSFER NOTE
Patient: Tammy Obregon    Procedure: Procedure(s):  CHOLECYSTECTOMY, ROBOT-ASSISTED, LAPAROSCOPIC, USING DA MITZY XI       Diagnosis: Symptomatic cholelithiasis [K80.20]  Diagnosis Additional Information: No value filed.    Anesthesia Type:   General     Note:    Oropharynx: oropharynx clear of all foreign objects  Level of Consciousness: drowsy  Oxygen Supplementation: face mask    Independent Airway: airway patency satisfactory and stable  Dentition: dentition unchanged  Vital Signs Stable: post-procedure vital signs reviewed and stable  Report to RN Given: handoff report given  Patient transferred to: PACU    Handoff Report: Identifed the Patient, Identified the Reponsible Provider, Reviewed the pertinent medical history, Discussed the surgical course, Reviewed Intra-OP anesthesia mangement and issues during anesthesia, Set expectations for post-procedure period and Allowed opportunity for questions and acknowledgement of understanding      Vitals:  Vitals Value Taken Time   BP     Temp     Pulse     Resp     SpO2         Electronically Signed By: CORY Lin CRNA  January 29, 2025  12:40 PM

## 2025-01-29 NOTE — ANESTHESIA PROCEDURE NOTES
Airway       Patient location during procedure: OR       Procedure Start/Stop Times: 1/29/2025 11:08 AM  Staff -        CRNA: Cecile Ott APRN CRNA       Other Anesthesia Staff: Aleks Aguilar       Performed By: CRNA and SRNA  Consent for Airway        Urgency: elective  Indications and Patient Condition       Indications for airway management: maggie-procedural       Induction type:intravenous       Mask difficulty assessment: 1 - vent by mask    Final Airway Details       Final airway type: endotracheal airway       Successful airway: ETT - single  Endotracheal Airway Details        ETT size (mm): 7.0       Cuffed: yes       Successful intubation technique: video laryngoscopy       VL Blade Size: Mendoza 3       Grade View of Cords: 1       Adjucts: stylet       Position: Right       Measured from: gums/teeth       Secured at (cm): 21       Bite block used: None    Post intubation assessment        Placement verified by: capnometry        Number of attempts at approach: 1       Number of other approaches attempted: 0       Secured with: tape       Ease of procedure: easy       Dentition: Intact    Medication(s) Administered   Medication Administration Time: 1/29/2025 11:08 AM

## 2025-01-29 NOTE — DISCHARGE INSTRUCTIONS
Same Day Surgery Discharge Instructions  Special Precautions After Surgery - Adult    It is not unusual to feel lightheaded or faint, up to 24 hours after surgery or while taking pain medication.  If you have these symptoms; sit for a few minutes before standing and have someone assist you when getting up.  You should rest and relax for the next 24 hours and must have someone stay with you for at least 24 hours after your discharge.  DO NOT DRIVE any vehicle or operate mechanical equipment for 24 hours following the end of your surgery.  DO NOT DRIVE while taking narcotic pain medications that have been prescribed by your physician.  If you had a limb operated on, you must be able to use it fully to drive.  DO NOT drink alcoholic beverages for 24 hours following surgery or while taking prescription pain medication.  Drink clear liquids (apple juice, ginger ale, broth, 7-Up, etc.).  Progress to your regular diet as you feel able.  Any questions call your physician and do not make important decisions for 24 hours.    Nausea and Vomiting: Nausea and vomiting can occur any time after receiving anesthesia. If you experience nausea and vomiting we encourage you to move to a clear liquid diet and advance your diet as tolerated. If nausea and vomiting do not improve within 12 hours please call the surgeon or present to the Emergency department.     Break-through Bleeding: If your experience bleeding from your surgical site apply pressure and additional dressing per nurse instruction. For simple problems such as a saturated dressing, you may need to reinforce the dressing with more gauze and tape and put slight pressure on the site. If bleeding does not subside contact the surgeon or present to the Emergency Department.    Post-op Infection: If you develop a fever of 100.4 or greater, have pus like drainage, redness, swelling or severe pain at the surgical site not alleviated with pain medications; please  contact the surgeon or present to the Emergency Department.     Medications:  Acetaminophen (Tylenol):  Next dose: 3:00 PM.  Ibuprofen (Motrin, Advil):  Next dose: 6:00 PM.  Oxycodone:  Next dose: Can start at any time if needed for moderate to severe pain.  Follow the instructions on the bottle.  __________________________________________________________________________________________________________________________________  IMPORTANT NUMBERS:    [facility] Main Number:  [phone], [phone]  Pharmacy:  [phone]  Same Day Surgery:  [phone], for general post-op questions call Monday - Thursday until 8:30 p.m., Fridays until 6:00 p.m.   Mental Health Mobile Crisis line: [phone]                                                                        General Surgery:  [phone]  Specialty Access Center: [phone]

## 2025-01-29 NOTE — OR NURSING
[facility] DISCHARGE NOTE    Patient discharged to home at 2:48 PM via wheel chair. Accompanied by mother and staff. Discharge instructions reviewed with patient and caregiver, opportunity offered to ask questions. Prescriptions filled and sent with patient upon discharge. All belongings sent with patient.    Laureen Weston RN

## 2025-01-29 NOTE — OP NOTE
OPERATIVE REPORT - STAFF        Tammy Obregon   : 1985   Sex: female   MRN: [id number]  2025 12:24 PM         Procedures Performed  Robotic-assisted cholecystectomy    Indications: Tammy Obregon was seen in surgery clinic for evaluation of symptomatic cholelithiasis. After a discussion with the patient regarding therapeutic options, risks, and benefits, a robotic-assisted cholecystectomy was recommended. Patient was amenable to the proposed plan    Pre-operative Diagnosis: symptomatic cholelithiasis    Post-operative Diagnosis: same    Surgeon(s):  Julio Anglin MD  Assistant:  Dominique Wren PA-C - Expert assistance was necessary for retraction and manipulation of the laparoscope during the procedure    Anesthesia: General    Procedure Details  The patient was seen in the Holding Room. The risks, benefits, indications, potential complications, treatment options, and expected outcomes were discussed with the patient. The possibilities of reaction to medication, bleeding, infection, the need for additional procedures, failure to diagnose a condition, and creating a complication requiring transfusion or further operation were discussed with the patient. The patient concurred with the proposed plan, giving informed consent.  The site of surgery was properly noted/marked. The patient was taken to the operating room, identified as Tammy Obregon and the procedure verified as robotic-assisted cholecystectomy. A Time Out was held and the above information confirmed.    The patient was placed in the supine position and general anesthetic was administered. Pre-operative antibiotics were administered.  The abdomen was prepped and draped in standard fashion.       An incision was made in the LUQ and, using a 5-mm optical entry trocar was placed under direct visualization to enter the abdomen. Following the smooth establishment of pneumoperitoneum, the abdomen was surveyed and no bleeding or injury  was identified. A total of three 8-mm robotic ports were placed under direct visualization - one to the left of the umbilicus and two right flank working ports. The 5 mm port was then upsized to an 8-mm robotic port. The patient was placed in reverse trendelenburg and the bed was airplaned to the patient's left. The robot was then docked.       Graspers were inserted and the gallbladder was grasped and retracted. A robotic hook cautery was used to dissect out the cystic duct and the cystic artery. This was done in such a manner that the cystic duct and artery were seen to course directly into the gallbladder and both structures were completely free from the liver bed. Critical view of safety pictured below. With the critical view achieved, a clip applier was used to place surgical clips across both the cystic duct and the artery. Two clips were placed proximally and one distally on the duct. One clip was placed proximally on the cystic artery and the distal artery was ligated using bipolar cautery. The robotic hook cautery was used to transect the cystic duct and artery.  The gallbladder was then dissected free from the gallbladder bed using electrocautery. The clips were reinspected and seen to be in excellent position. There was no evidence of bleeding or bile leakage from either the cystic duct stump, cystic artery stumps or the gallbladder bed itself.      At this point the gallbladder was placed within an endocatch bag via the left upper quadrant port. The gallbladder bed was again inspected and hemostasis was assured. The gallbladder was then withdrawn from the abdomen and passed of the field as a specimen. The left upper quadrant port was closed intra-corporeally using a running 3-0 stratafix suture. The robot was undocked.     The abdomen was desuflated and all ports were then removed with no evidence of bleeding. All port sites were then closed at the skin using interrupted 4-0 Monocryl subcuticular  sutures. Dermabond was then applied, and the patient was awakened and extubated by Anesthesia and transported to Postanesthesia Care Unit (PACU) in good condition    Instrument, sponge, and needle counts were correct at closure and at the conclusion of the case.    Findings: cholecystectomy performed without complication    Estimated Blood Loss:  5 ml        Drains: None        Total IV Fluids:  See anesthesia        Specimens: gallbladder    Complications:  None        Disposition: PACU          Julio Anglin MD

## 2025-01-29 NOTE — ANESTHESIA POSTPROCEDURE EVALUATION
Patient: Tammy Obregon    Procedure: Procedure(s):  CHOLECYSTECTOMY, ROBOT-ASSISTED, LAPAROSCOPIC, USING DA MITZY XI       Anesthesia Type:  General    Note:  Disposition: Outpatient   Postop Pain Control: Uneventful            Sign Out: Well controlled pain   PONV: No   Neuro/Psych: Uneventful            Sign Out: Acceptable/Baseline neuro status   Airway/Respiratory: Uneventful            Sign Out: Acceptable/Baseline resp. status   CV/Hemodynamics: Uneventful            Sign Out: Acceptable CV status; No obvious hypovolemia; No obvious fluid overload   Other NRE: NONE   DID A NON-ROUTINE EVENT OCCUR? No           Last vitals:  Vitals Value Taken Time   /75 01/29/25 1345   Temp 36.7  C (98.1  F) 01/29/25 1330   Pulse 87 01/29/25 1345   Resp 7 01/29/25 1345   SpO2 95 % 01/29/25 1346   Vitals shown include unfiled device data.    Electronically Signed By: Aleks Aguilar  January 29, 2025  2:01 PM

## 2025-01-31 LAB
PATH REPORT.COMMENTS IMP SPEC: NORMAL
PATH REPORT.COMMENTS IMP SPEC: NORMAL
PATH REPORT.FINAL DX SPEC: NORMAL
PATH REPORT.GROSS SPEC: NORMAL
PATH REPORT.MICROSCOPIC SPEC OTHER STN: NORMAL
PATH REPORT.RELEVANT HX SPEC: NORMAL
PHOTO IMAGE: NORMAL

## 2025-02-10 ENCOUNTER — OFFICE VISIT (OUTPATIENT)
Dept: SURGERY | Facility: CLINIC | Age: 40
End: 2025-02-10
Payer: COMMERCIAL

## 2025-02-10 VITALS
OXYGEN SATURATION: 100 % | WEIGHT: 179 LBS | TEMPERATURE: 100.1 F | HEART RATE: 106 BPM | BODY MASS INDEX: 30.71 KG/M2 | SYSTOLIC BLOOD PRESSURE: 131 MMHG | DIASTOLIC BLOOD PRESSURE: 91 MMHG

## 2025-02-10 DIAGNOSIS — Z98.890 S/P ROBOT-ASSISTED SURGICAL PROCEDURE: ICD-10-CM

## 2025-02-10 DIAGNOSIS — Z90.49 S/P LAPAROSCOPIC CHOLECYSTECTOMY: Primary | ICD-10-CM

## 2025-02-10 PROCEDURE — 99024 POSTOP FOLLOW-UP VISIT: CPT | Performed by: STUDENT IN AN ORGANIZED HEALTH CARE EDUCATION/TRAINING PROGRAM

## 2025-02-10 ASSESSMENT — PATIENT HEALTH QUESTIONNAIRE - PHQ9: SUM OF ALL RESPONSES TO PHQ QUESTIONS 1-9: 5

## 2025-02-10 ASSESSMENT — PAIN SCALES - GENERAL: PAINLEVEL_OUTOF10: NO PAIN (0)

## 2025-02-10 NOTE — PROGRESS NOTES
Surgery Post-op Note  [facility]  [address] [place] [address]  T: [phone]  F: [phone]    Subjective:     Tammy Obregon presents to the clinic after undergoing a robotic-assisted cholecystectomy on 1/29/25.  Patient is here for follow up. The patient reports no further incisional pain.  Patient is currently tolerating a regular diet.  Patient states bowel habits are regular and soft. Patient denies significant nausea or vomiting.        Objective:     Vitals:   BP (!) 131/91 (BP Location: Right arm, Patient Position: Sitting, Cuff Size: Adult Regular)   Pulse 106   Temp 100.1  F (37.8  C) (Tympanic)   Wt 81.2 kg (179 lb)   LMP 12/11/2024   SpO2 100%   BMI 30.71 kg/m     General Appearance:    Tammy is a well-appearing female who is in no acute distress.   Cardiac:    RRR, extremities warm and well perfused    Pulmonary:  Nonlabored breathing on room air   Abdomen:    Soft, nontender, nondistended   Incision: The incision sites are healing well. There are no signs of cellulitis or drainage.        Labs/Imaging:     No new labs/imaging       Pathology:     Final Diagnosis   Gallbladder, cholecystectomy:  -- Chronic cholecystitis and cholesterolosis.        Assessment:     Ms.Cassandra GISELLE Obregon is status post robotic-assisted cholecystectomy, doing well      Plan:   1. The patient is instructed to call the office if there is any increasing incisional pain, swelling, redness, drainage, or any other problems.   2. Ok to return to normal activities  3. Follow up in surgery clinic as needed if any further questions or concerns    Julio Anglin MD on 2/10/2025 at 3:45 PM

## 2025-02-10 NOTE — NURSING NOTE
"Initial BP (!) 131/91 (BP Location: Right arm, Patient Position: Sitting, Cuff Size: Adult Regular)   Pulse 106   Temp 100.1  F (37.8  C) (Tympanic)   Wt 81.2 kg (179 lb)   LMP 12/11/2024   SpO2 100%   BMI 30.71 kg/m   Estimated body mass index is 30.71 kg/m  as calculated from the following:    Height as of 1/29/25: 1.626 m (5' 4.02\").    Weight as of this encounter: 81.2 kg (179 lb). .  Susie Aponte MA on 2/10/2025 at 3:44 PM    "

## 2025-02-10 NOTE — LETTER
2/10/2025      Tammy Obregon  [address]  [place] [address]      Dear Colleague,    Thank you for referring your patient, Tammy Obregon, to the [facility]. Please see a copy of my visit note below.    Surgery Post-op Note  [facility]  [address] [place] [address]  T: [phone]  F: [phone]    Subjective:     Tammy Obregon presents to the clinic after undergoing a robotic-assisted cholecystectomy on 1/29/25.  Patient is here for follow up. The patient reports no further incisional pain.  Patient is currently tolerating a regular diet.  Patient states bowel habits are regular and soft. Patient denies significant nausea or vomiting.        Objective:     Vitals:   BP (!) 131/91 (BP Location: Right arm, Patient Position: Sitting, Cuff Size: Adult Regular)   Pulse 106   Temp 100.1  F (37.8  C) (Tympanic)   Wt 81.2 kg (179 lb)   LMP 12/11/2024   SpO2 100%   BMI 30.71 kg/m     General Appearance:    Tammy is a well-appearing female who is in no acute distress.   Cardiac:    RRR, extremities warm and well perfused    Pulmonary:  Nonlabored breathing on room air   Abdomen:    Soft, nontender, nondistended   Incision: The incision sites are healing well. There are no signs of cellulitis or drainage.        Labs/Imaging:     No new labs/imaging       Pathology:     Final Diagnosis   Gallbladder, cholecystectomy:  -- Chronic cholecystitis and cholesterolosis.        Assessment:     Ms.Cassandra GISELLE Obregon is status post robotic-assisted cholecystectomy, doing well      Plan:   1. The patient is instructed to call the office if there is any increasing incisional pain, swelling, redness, drainage, or any other problems.   2. Ok to return to normal activities  3. Follow up in surgery clinic as needed if any further questions or concerns    Julio Anglin MD on 2/10/2025 at 3:45 PM       Again, thank you for allowing me to participate in the  care of your patient.        Sincerely,        Julio Anglin MD    Electronically signed

## 2025-07-22 ASSESSMENT — PATIENT HEALTH QUESTIONNAIRE - PHQ9
SUM OF ALL RESPONSES TO PHQ QUESTIONS 1-9: 8
10. IF YOU CHECKED OFF ANY PROBLEMS, HOW DIFFICULT HAVE THESE PROBLEMS MADE IT FOR YOU TO DO YOUR WORK, TAKE CARE OF THINGS AT HOME, OR GET ALONG WITH OTHER PEOPLE: SOMEWHAT DIFFICULT
SUM OF ALL RESPONSES TO PHQ QUESTIONS 1-9: 8

## 2025-07-23 ENCOUNTER — OFFICE VISIT (OUTPATIENT)
Dept: FAMILY MEDICINE | Facility: CLINIC | Age: 40
End: 2025-07-23
Payer: COMMERCIAL

## 2025-07-23 VITALS
TEMPERATURE: 97.8 F | RESPIRATION RATE: 16 BRPM | SYSTOLIC BLOOD PRESSURE: 133 MMHG | BODY MASS INDEX: 33.7 KG/M2 | HEART RATE: 76 BPM | WEIGHT: 197.4 LBS | HEIGHT: 64 IN | DIASTOLIC BLOOD PRESSURE: 86 MMHG | OXYGEN SATURATION: 99 %

## 2025-07-23 DIAGNOSIS — R06.83 SNORING: Primary | ICD-10-CM

## 2025-07-23 DIAGNOSIS — R53.82 CHRONIC FATIGUE: ICD-10-CM

## 2025-07-23 DIAGNOSIS — M54.2 NECK PAIN: ICD-10-CM

## 2025-07-23 LAB
ALBUMIN SERPL BCG-MCNC: 4.4 G/DL (ref 3.5–5.2)
ALP SERPL-CCNC: 88 U/L (ref 40–150)
ALT SERPL W P-5'-P-CCNC: 18 U/L (ref 0–50)
ANION GAP SERPL CALCULATED.3IONS-SCNC: 9 MMOL/L (ref 7–15)
AST SERPL W P-5'-P-CCNC: 20 U/L (ref 0–45)
BILIRUB SERPL-MCNC: 0.3 MG/DL
BUN SERPL-MCNC: 8.9 MG/DL (ref 6–20)
CALCIUM SERPL-MCNC: 9.6 MG/DL (ref 8.8–10.4)
CHLORIDE SERPL-SCNC: 103 MMOL/L (ref 98–107)
CREAT SERPL-MCNC: 0.65 MG/DL (ref 0.51–0.95)
CRP SERPL-MCNC: <3 MG/L
EGFRCR SERPLBLD CKD-EPI 2021: >90 ML/MIN/1.73M2
ERYTHROCYTE [SEDIMENTATION RATE] IN BLOOD BY WESTERGREN METHOD: 19 MM/HR (ref 0–20)
FERRITIN SERPL-MCNC: 55 NG/ML (ref 6–175)
GLUCOSE SERPL-MCNC: 84 MG/DL (ref 70–99)
HCO3 SERPL-SCNC: 26 MMOL/L (ref 22–29)
HGB BLD-MCNC: 12.4 G/DL (ref 11.7–15.7)
IRON BINDING CAPACITY (ROCHE): 352 UG/DL (ref 240–430)
IRON SATN MFR SERPL: 25 % (ref 15–46)
IRON SERPL-MCNC: 88 UG/DL (ref 37–145)
MCV RBC AUTO: 85 FL (ref 78–100)
POTASSIUM SERPL-SCNC: 4.6 MMOL/L (ref 3.4–5.3)
PROT SERPL-MCNC: 7.8 G/DL (ref 6.4–8.3)
SODIUM SERPL-SCNC: 138 MMOL/L (ref 135–145)
TSH SERPL DL<=0.005 MIU/L-ACNC: 1.73 UIU/ML (ref 0.3–4.2)
VIT B12 SERPL-MCNC: 545 PG/ML (ref 232–1245)
VIT D+METAB SERPL-MCNC: 29 NG/ML (ref 20–50)

## 2025-07-23 PROCEDURE — 36415 COLL VENOUS BLD VENIPUNCTURE: CPT

## 2025-07-23 PROCEDURE — 85018 HEMOGLOBIN: CPT

## 2025-07-23 PROCEDURE — 80053 COMPREHEN METABOLIC PANEL: CPT

## 2025-07-23 PROCEDURE — 84443 ASSAY THYROID STIM HORMONE: CPT

## 2025-07-23 PROCEDURE — 99214 OFFICE O/P EST MOD 30 MIN: CPT

## 2025-07-23 PROCEDURE — 83540 ASSAY OF IRON: CPT

## 2025-07-23 PROCEDURE — 83550 IRON BINDING TEST: CPT

## 2025-07-23 PROCEDURE — 82607 VITAMIN B-12: CPT

## 2025-07-23 PROCEDURE — 82728 ASSAY OF FERRITIN: CPT

## 2025-07-23 PROCEDURE — G2211 COMPLEX E/M VISIT ADD ON: HCPCS

## 2025-07-23 PROCEDURE — 3079F DIAST BP 80-89 MM HG: CPT

## 2025-07-23 PROCEDURE — 85652 RBC SED RATE AUTOMATED: CPT

## 2025-07-23 PROCEDURE — 82306 VITAMIN D 25 HYDROXY: CPT

## 2025-07-23 PROCEDURE — 3075F SYST BP GE 130 - 139MM HG: CPT

## 2025-07-23 PROCEDURE — 86140 C-REACTIVE PROTEIN: CPT

## 2025-07-23 RX ORDER — LORATADINE 10 MG/1
TABLET ORAL
COMMUNITY
Start: 2025-04-01

## 2025-07-23 ASSESSMENT — ENCOUNTER SYMPTOMS: FATIGUE: 1

## 2025-07-23 NOTE — PROGRESS NOTES
"  Assessment & Plan     Snoring  Referral placed for sleep study  - Adult Sleep Eval & Management  Referral    Chronic fatigue  Pt reports chronic fatigue for last 2 months. Also feeling more short of breath than usual with activity. Fatigue could be related to multiple life stressors pt is experiencing, potential sleep apnea given snoring. Lab workup came back negative. Will order an echo to rule out cardiac causes, particularly given occasional intermittent L neck pressure.   - TSH with free T4 reflex  - Vitamin D Deficiency  - Vitamin B12  - Hemoglobin  - Iron and iron binding capacity  - Ferritin  - Comprehensive metabolic panel (BMP + Alb, Alk Phos, ALT, AST, Total. Bili, TP)  - Adult Sleep Eval & Management  Referral  - ESR: Erythrocyte sedimentation rate  - CRP, inflammation  - TSH with free T4 reflex  - Vitamin D Deficiency  - Vitamin B12  - Hemoglobin  - Iron and iron binding capacity  - Ferritin  - Comprehensive metabolic panel (BMP + Alb, Alk Phos, ALT, AST, Total. Bili, TP)  - ESR: Erythrocyte sedimentation rate  - CRP, inflammation    Neck pain  Pt describes pressure sensation L side of neck. No pain with movement of head, palpation. No lymphadenopathy. Could be MSK in nature, plan to complete echo. No JVD noted      The longitudinal plan of care for the diagnosis(es)/condition(s) as documented were addressed during this visit. Due to the added complexity in care, I will continue to support Tiffanie in the subsequent management and with ongoing continuity of care.    BMI  Estimated body mass index is 33.86 kg/m  as calculated from the following:    Height as of this encounter: 1.626 m (5' 4.02\").    Weight as of this encounter: 89.5 kg (197 lb 6.4 oz).         Abner Moreno is a 39 year old, presenting for the following health issues:  Fatigue      7/23/2025     8:52 AM   Additional Questions   Roomed by Snehal STAUFFER   Accompanied by self     Fatigue  Associated symptoms include fatigue. " "  History of Present Illness       Reason for visit:  Fatigue, neck pressure, shortness of breath  Symptom onset:  More than a month  Symptoms include:  Fatigue - mental and muscle  Symptom intensity:  Moderate  Symptom progression:  Staying the same  Had these symptoms before:  No  What makes it worse:  No  What makes it better:  No   She is taking medications regularly.          Feels more tired than usual, feels like she has muscle fatigue. Gets occasional pressure left side of neck. No difficulty swallowing. Feeling short of breath with exertion. No swelling in legs, no chest pain. No wheezing or chest tightness    Has been waking up in the middle of the night    Grandfather had heart issues. No one else in the family.     Pt has been under a lot of stress lately       Pt reports she snores at night.           1/23/2025     6:19 PM 2/10/2025     3:39 PM 7/22/2025     9:17 PM   PHQ   PHQ-9 Total Score 7  5 8    Q9: Thoughts of better off dead/self-harm past 2 weeks Not at all Not at all Not at all       Patient-reported                 Objective    /86   Pulse 76   Temp 97.8  F (36.6  C) (Tympanic)   Resp 16   Ht 1.626 m (5' 4.02\")   Wt 89.5 kg (197 lb 6.4 oz)   LMP 07/19/2025 (Exact Date)   SpO2 99%   BMI 33.86 kg/m    Body mass index is 33.86 kg/m .  Physical Exam   GENERAL: alert and no distress  NECK: no adenopathy, no asymmetry, masses, or scars  RESP: lungs clear to auscultation - no rales, rhonchi or wheezes  CV: regular rate and rhythm, normal S1 S2, no S3 or S4, no murmur, click or rub, no peripheral edema  ABDOMEN: soft, nontender, no hepatosplenomegaly, no masses and bowel sounds normal  MS: no gross musculoskeletal defects noted, no edema          Signed Electronically by: CORY Baum CNP    "

## 2025-07-24 ENCOUNTER — PATIENT OUTREACH (OUTPATIENT)
Dept: CARE COORDINATION | Facility: CLINIC | Age: 40
End: 2025-07-24
Payer: COMMERCIAL

## 2025-07-28 ENCOUNTER — PATIENT OUTREACH (OUTPATIENT)
Dept: CARE COORDINATION | Facility: CLINIC | Age: 40
End: 2025-07-28
Payer: COMMERCIAL

## 2025-08-03 DIAGNOSIS — F32.9 REACTIVE DEPRESSION: ICD-10-CM

## 2025-08-04 RX ORDER — SERTRALINE HYDROCHLORIDE 100 MG/1
100 TABLET, FILM COATED ORAL DAILY
Qty: 90 TABLET | Refills: 1 | Status: SHIPPED | OUTPATIENT
Start: 2025-08-04

## 2025-08-11 ENCOUNTER — HOSPITAL ENCOUNTER (OUTPATIENT)
Dept: CARDIOLOGY | Facility: CLINIC | Age: 40
Discharge: HOME | End: 2025-08-11
Payer: COMMERCIAL

## 2025-08-11 DIAGNOSIS — R06.09 DOE (DYSPNEA ON EXERTION): ICD-10-CM

## 2025-08-11 DIAGNOSIS — R53.82 CHRONIC FATIGUE: ICD-10-CM

## 2025-08-11 LAB — LVEF ECHO: NORMAL

## 2025-08-11 PROCEDURE — 93306 TTE W/DOPPLER COMPLETE: CPT | Mod: 26 | Performed by: INTERNAL MEDICINE

## 2025-08-11 PROCEDURE — 93306 TTE W/DOPPLER COMPLETE: CPT

## (undated) DEVICE — SYR 30ML SLIP TIP W/O NDL 302833

## (undated) DEVICE — DRAPE U SPLIT 74X120" 29440

## (undated) DEVICE — DAVINCI XI CLIP APPLIER MED HEM-O-LOK GREEN 470327

## (undated) DEVICE — FILTER LAPROSHIELD SMOKE EVAC LSF1

## (undated) DEVICE — DRAPE TIBURON GENERAL ENDOSCOPY 9458

## (undated) DEVICE — DAVINCI XI ESU FORCE BIPOLAR 8MM 471405

## (undated) DEVICE — KIT PATIENT POSITIONING PIGAZZI LATEX FREE 40580

## (undated) DEVICE — SU STRATAFIX PDS PLUS 3-0 SPIRAL SH 15CM SXPP1B420

## (undated) DEVICE — ENDO TROCAR FIRST ENTRY KII FIOS Z-THRD 05X100MM CTF03

## (undated) DEVICE — STOCKING SLEEVE COMPRESSION CALF MED

## (undated) DEVICE — DAVINCI XI DRAPE ARM 470015

## (undated) DEVICE — DAVINCI XI SEAL UNIVERSAL 5-12MM 470500

## (undated) DEVICE — GOWN XLG DISP 9545

## (undated) DEVICE — SU MONOCRYL 4-0 PS-2 18" UND Y496G

## (undated) DEVICE — DAVINCI XI MONOPOLAR SCISSORS HOT SHEARS 8MM 470179

## (undated) DEVICE — SU DERMABOND ADVANCED .7ML DNX12

## (undated) DEVICE — Device

## (undated) DEVICE — ANTIFOG SOLUTION SEE SHARP 150M TROCAR SWABS 30978

## (undated) DEVICE — CLIP ENDO HEMO-LOC GREEN MED/LG 544230

## (undated) DEVICE — DAVINCI XI OBTURATOR BLADELESS 8MM 470359

## (undated) DEVICE — GLOVE BIOGEL PI MICRO INDICATOR UNDERGLOVE SZ 7.5 48975

## (undated) DEVICE — DECANTER VIAL 2006S

## (undated) DEVICE — ENDO POUCH UNIVERSAL RETRIEVAL SYSTEM INZII 5MM CD003

## (undated) DEVICE — SOL NACL 0.9% IRRIG 1000ML BOTTLE 07138-09

## (undated) DEVICE — DAVINCI XI DRAPE COLUMN 470341

## (undated) DEVICE — PREP CHLORAPREP 26ML TINTED ORANGE  260815

## (undated) DEVICE — GLOVE BIOGEL PI MICRO SZ 7.0 48570

## (undated) DEVICE — SYR 20ML SLIP TIP

## (undated) DEVICE — DAVINCI XI DRIVER NDL MEGA SUTURECUT 8MM EXT 471309

## (undated) RX ORDER — LIDOCAINE HYDROCHLORIDE AND EPINEPHRINE 10; 10 MG/ML; UG/ML
INJECTION, SOLUTION INFILTRATION; PERINEURAL
Status: DISPENSED
Start: 2025-01-29

## (undated) RX ORDER — FENTANYL CITRATE 50 UG/ML
INJECTION, SOLUTION INTRAMUSCULAR; INTRAVENOUS
Status: DISPENSED
Start: 2025-01-29

## (undated) RX ORDER — ACETAMINOPHEN 325 MG/1
TABLET ORAL
Status: DISPENSED
Start: 2025-01-29

## (undated) RX ORDER — HEPARIN SODIUM 5000 [USP'U]/.5ML
INJECTION, SOLUTION INTRAVENOUS; SUBCUTANEOUS
Status: DISPENSED
Start: 2025-01-29

## (undated) RX ORDER — METHOCARBAMOL 750 MG/1
TABLET, FILM COATED ORAL
Status: DISPENSED
Start: 2025-01-29

## (undated) RX ORDER — KETOROLAC TROMETHAMINE 30 MG/ML
INJECTION, SOLUTION INTRAMUSCULAR; INTRAVENOUS
Status: DISPENSED
Start: 2025-01-29

## (undated) RX ORDER — HYDROMORPHONE HCL IN WATER/PF 6 MG/30 ML
PATIENT CONTROLLED ANALGESIA SYRINGE INTRAVENOUS
Status: DISPENSED
Start: 2025-01-29

## (undated) RX ORDER — GABAPENTIN 300 MG/1
CAPSULE ORAL
Status: DISPENSED
Start: 2025-01-29

## (undated) RX ORDER — BUPIVACAINE HYDROCHLORIDE 5 MG/ML
INJECTION, SOLUTION EPIDURAL; INTRACAUDAL
Status: DISPENSED
Start: 2025-01-29